# Patient Record
Sex: FEMALE | Race: WHITE | Employment: FULL TIME | URBAN - METROPOLITAN AREA
[De-identification: names, ages, dates, MRNs, and addresses within clinical notes are randomized per-mention and may not be internally consistent; named-entity substitution may affect disease eponyms.]

---

## 2017-05-15 ENCOUNTER — HOSPITAL ENCOUNTER (OUTPATIENT)
Age: 42
Discharge: HOME | End: 2017-05-15
Payer: COMMERCIAL

## 2017-05-15 VITALS
HEART RATE: 89 BPM | TEMPERATURE: 98 F | RESPIRATION RATE: 18 BRPM | OXYGEN SATURATION: 97 % | SYSTOLIC BLOOD PRESSURE: 130 MMHG | DIASTOLIC BLOOD PRESSURE: 95 MMHG

## 2017-05-15 DIAGNOSIS — J98.01 ACUTE BRONCHOSPASM: Primary | ICD-10-CM

## 2017-05-15 PROCEDURE — 99204 OFFICE O/P NEW MOD 45 MIN: CPT

## 2017-05-15 PROCEDURE — 99213 OFFICE O/P EST LOW 20 MIN: CPT

## 2017-05-15 RX ORDER — MONTELUKAST SODIUM 10 MG/1
10 TABLET ORAL NIGHTLY
Qty: 21 TABLET | Refills: 0 | Status: SHIPPED | OUTPATIENT
Start: 2017-05-15

## 2017-05-15 RX ORDER — PREDNISONE 20 MG/1
40 TABLET ORAL DAILY
Qty: 10 TABLET | Refills: 0 | Status: ON HOLD | OUTPATIENT
Start: 2017-05-15 | End: 2017-05-19

## 2017-05-15 RX ORDER — CODEINE PHOSPHATE AND GUAIFENESIN 10; 100 MG/5ML; MG/5ML
10 SOLUTION ORAL NIGHTLY PRN
Qty: 118 ML | Refills: 0 | Status: ON HOLD | OUTPATIENT
Start: 2017-05-15 | End: 2017-05-23

## 2017-05-15 NOTE — ED INITIAL ASSESSMENT (HPI)
Here for eval of cough and chest congestion that started on Friday. Sts that on Thursday she was using her inhaler more d/t feeling SOB. Sts that she is now using her inhaler every 4 hours w/ minimal relief.

## 2017-05-15 NOTE — ED PROVIDER NOTES
Patient Seen in: THE [facility] In [facility]    History   Patient presents with:  Cough    Stated Complaint: 5 DAYS COUGH,CHEST TIGHTNESS    HPI    Patient is a pleasant 49-year-old female.   Patient has a medical history of asthma.  5 days prior Age of Onset   • Breast Cancer Mother 40         Smoking Status: Never Smoker                      Smokeless Status: Never Used                        Alcohol Use: No                Review of Systems    Positive for stated complaint: 5 DAYS COUGH,CHEST TIG course initiated. Cheratussin for nighttime usage. Push clear fluids. Monitor for fever or chills. Please return if worse.       Disposition and Plan     Clinical Impression:  Acute bronchospasm  (primary encounter diagnosis)    Disposition:  Discharge

## 2017-05-18 ENCOUNTER — HOSPITAL ENCOUNTER (OUTPATIENT)
Facility: HOSPITAL | Age: 42
Setting detail: OBSERVATION
Discharge: HOME | End: 2017-05-19
Attending: EMERGENCY MEDICINE | Admitting: EMERGENCY MEDICINE
Payer: COMMERCIAL

## 2017-05-18 ENCOUNTER — APPOINTMENT (OUTPATIENT)
Dept: GENERAL RADIOLOGY | Facility: HOSPITAL | Age: 42
End: 2017-05-18
Attending: EMERGENCY MEDICINE
Payer: COMMERCIAL

## 2017-05-18 DIAGNOSIS — J45.901 ASTHMA EXACERBATION: Primary | ICD-10-CM

## 2017-05-18 PROCEDURE — 87486 CHLMYD PNEUM DNA AMP PROBE: CPT | Performed by: INTERNAL MEDICINE

## 2017-05-18 PROCEDURE — 99285 EMERGENCY DEPT VISIT HI MDM: CPT

## 2017-05-18 PROCEDURE — 85378 FIBRIN DEGRADE SEMIQUANT: CPT | Performed by: EMERGENCY MEDICINE

## 2017-05-18 PROCEDURE — 71020 XR CHEST PA + LAT CHEST (CPT=71020): CPT | Performed by: EMERGENCY MEDICINE

## 2017-05-18 PROCEDURE — 94640 AIRWAY INHALATION TREATMENT: CPT

## 2017-05-18 PROCEDURE — 84484 ASSAY OF TROPONIN QUANT: CPT | Performed by: EMERGENCY MEDICINE

## 2017-05-18 PROCEDURE — 87633 RESP VIRUS 12-25 TARGETS: CPT | Performed by: INTERNAL MEDICINE

## 2017-05-18 PROCEDURE — 99285 EMERGENCY DEPT VISIT HI MDM: CPT | Performed by: EMERGENCY MEDICINE

## 2017-05-18 PROCEDURE — 80053 COMPREHEN METABOLIC PANEL: CPT | Performed by: EMERGENCY MEDICINE

## 2017-05-18 PROCEDURE — 96374 THER/PROPH/DIAG INJ IV PUSH: CPT | Performed by: EMERGENCY MEDICINE

## 2017-05-18 PROCEDURE — 85025 COMPLETE CBC W/AUTO DIFF WBC: CPT | Performed by: EMERGENCY MEDICINE

## 2017-05-18 PROCEDURE — 93005 ELECTROCARDIOGRAM TRACING: CPT

## 2017-05-18 PROCEDURE — 93010 ELECTROCARDIOGRAM REPORT: CPT

## 2017-05-18 PROCEDURE — 94644 CONT INHLJ TX 1ST HOUR: CPT

## 2017-05-18 PROCEDURE — 87581 M.PNEUMON DNA AMP PROBE: CPT | Performed by: INTERNAL MEDICINE

## 2017-05-18 PROCEDURE — 87798 DETECT AGENT NOS DNA AMP: CPT | Performed by: INTERNAL MEDICINE

## 2017-05-18 PROCEDURE — 94645 CONT INHLJ TX EACH ADDL HOUR: CPT

## 2017-05-18 RX ORDER — METHYLPREDNISOLONE SODIUM SUCCINATE 125 MG/2ML
125 INJECTION, POWDER, LYOPHILIZED, FOR SOLUTION INTRAMUSCULAR; INTRAVENOUS ONCE
Status: COMPLETED | OUTPATIENT
Start: 2017-05-18 | End: 2017-05-18

## 2017-05-18 RX ORDER — CODEINE PHOSPHATE AND GUAIFENESIN 10; 100 MG/5ML; MG/5ML
10 SOLUTION ORAL NIGHTLY PRN
Status: DISCONTINUED | OUTPATIENT
Start: 2017-05-18 | End: 2017-05-19

## 2017-05-18 RX ORDER — FLUTICASONE PROPIONATE 50 MCG
1 SPRAY, SUSPENSION (ML) NASAL DAILY
COMMUNITY

## 2017-05-18 RX ORDER — PANTOPRAZOLE SODIUM 20 MG/1
20 TABLET, DELAYED RELEASE ORAL
Status: DISCONTINUED | OUTPATIENT
Start: 2017-05-19 | End: 2017-05-19

## 2017-05-18 RX ORDER — ALBUTEROL SULFATE 90 UG/1
2 AEROSOL, METERED RESPIRATORY (INHALATION) EVERY 6 HOURS PRN
COMMUNITY
End: 2022-01-06

## 2017-05-18 RX ORDER — HEPARIN SODIUM 5000 [USP'U]/ML
5000 INJECTION, SOLUTION INTRAVENOUS; SUBCUTANEOUS EVERY 8 HOURS SCHEDULED
Status: DISCONTINUED | OUTPATIENT
Start: 2017-05-18 | End: 2017-05-19

## 2017-05-18 RX ORDER — IPRATROPIUM BROMIDE AND ALBUTEROL SULFATE 2.5; .5 MG/3ML; MG/3ML
3 SOLUTION RESPIRATORY (INHALATION) ONCE
Status: COMPLETED | OUTPATIENT
Start: 2017-05-18 | End: 2017-05-18

## 2017-05-18 RX ORDER — CETIRIZINE HYDROCHLORIDE 10 MG/1
10 TABLET ORAL NIGHTLY
Status: DISCONTINUED | OUTPATIENT
Start: 2017-05-18 | End: 2017-05-19

## 2017-05-18 RX ORDER — DIPHENHYDRAMINE HCL 25 MG
25 CAPSULE ORAL EVERY 6 HOURS PRN
Status: DISCONTINUED | OUTPATIENT
Start: 2017-05-18 | End: 2017-05-19

## 2017-05-18 RX ORDER — FLUTICASONE PROPIONATE 50 MCG
1 SPRAY, SUSPENSION (ML) NASAL DAILY
Status: DISCONTINUED | OUTPATIENT
Start: 2017-05-18 | End: 2017-05-19

## 2017-05-18 RX ORDER — IPRATROPIUM BROMIDE AND ALBUTEROL SULFATE 2.5; .5 MG/3ML; MG/3ML
3 SOLUTION RESPIRATORY (INHALATION)
Status: DISCONTINUED | OUTPATIENT
Start: 2017-05-18 | End: 2017-05-19

## 2017-05-18 RX ORDER — METHYLPREDNISOLONE SODIUM SUCCINATE 40 MG/ML
60 INJECTION, POWDER, LYOPHILIZED, FOR SOLUTION INTRAMUSCULAR; INTRAVENOUS EVERY 8 HOURS
Status: DISCONTINUED | OUTPATIENT
Start: 2017-05-18 | End: 2017-05-19

## 2017-05-18 RX ORDER — POTASSIUM CHLORIDE 20 MEQ/1
40 TABLET, EXTENDED RELEASE ORAL EVERY 4 HOURS
Status: COMPLETED | OUTPATIENT
Start: 2017-05-18 | End: 2017-05-19

## 2017-05-18 RX ORDER — MONTELUKAST SODIUM 10 MG/1
10 TABLET ORAL NIGHTLY
Status: DISCONTINUED | OUTPATIENT
Start: 2017-05-18 | End: 2017-05-19

## 2017-05-18 RX ORDER — ONDANSETRON 2 MG/ML
4 INJECTION INTRAMUSCULAR; INTRAVENOUS EVERY 6 HOURS PRN
Status: DISCONTINUED | OUTPATIENT
Start: 2017-05-18 | End: 2017-05-19

## 2017-05-18 NOTE — ED PROVIDER NOTES
Patient Seen in: [facility] Emergency Department    History   Patient presents with:  Dyspnea BERNARDO SOB (respiratory)  Chest Pain Angina (cardiovascular)    Stated Complaint: asthma, chest tightness    HPI    51-year-old white female who presents to t Take 1 tablet by mouth nightly.    Albuterol Sulfate HFA (PROAIR HFA) 108 (90 BASE) MCG/ACT Inhalation Aero Soln,  2 puffs  q 4hrs prn with spacer       Family History   Problem Relation Age of Onset   • Breast Cancer Mother 40         Smoking Status: Never (14) - Abnormal; Notable for the following:     Glucose 101 (*)     AST 14 (*)     Potassium 3.4 (*)     All other components within normal limits   CBC W/ DIFFERENTIAL - Abnormal; Notable for the following:     Neutrophil Absolute Prelim 9.03 (*)     Neut x-ray reveals:  FINDINGS:    LUNGS:  No focal consolidation.  Normal vascularity. CARDIAC:  Normal size cardiac silhouette. MEDIASTINUM:  Normal.  PLEURA:  Normal.  No pleural effusions.   BONES:  Normal for age.      Impression:     CONCLUSION:  No conso

## 2017-05-18 NOTE — CONSULTS
Pulmonary / Critical Care H&P/Consult       NAME: Tom Goff - ROOM: [facility] - MRN: [id number] - Age: 43year old - :  1975    Date of Admission: 2017  7:34 AM  Admission Diagnosis: There are no admission diagnoses documented for this encoun Disp:  Rfl:    Montelukast Sodium (SINGULAIR) 10 MG Oral Tab Take 1 tablet (10 mg total) by mouth nightly. Disp: 21 tablet Rfl: 0   predniSONE 20 MG Oral Tab Take 2 tablets (40 mg total) by mouth daily.  Disp: 10 tablet Rfl: 0   guaiFENesin-codeine (CHERATU gallop   Abdomen:     Soft, non-tender, bowel sounds active all four quadrants,     no masses, no organomegaly   Extremities:   Extremities normal, atraumatic, no cyanosis or edema   Pulses:   2+ and symmetric all extremities   Skin:   Skin color, texture,

## 2017-05-18 NOTE — PLAN OF CARE
PAIN - ADULT    • Verbalizes/displays adequate comfort level or patient's stated pain goal Progressing        Patient/Family Goals    • Patient/Family Long Term Goal Progressing    • Patient/Family Short Term Goal Progressing        RISK FOR INFECTION - AD

## 2017-05-18 NOTE — ED INITIAL ASSESSMENT (HPI)
Patient to ED c/o chest tightness, hx of asthma. Patient was seen at the Immediate Care on Monday, doesn't feel like she is improving.   Has been using her inhalers, last albuterol at 4:30am.  Reports she came in today d/t lung tightness and congested coug

## 2017-05-19 VITALS
BODY MASS INDEX: 34.1 KG/M2 | HEIGHT: 68 IN | DIASTOLIC BLOOD PRESSURE: 75 MMHG | WEIGHT: 225 LBS | HEART RATE: 113 BPM | RESPIRATION RATE: 16 BRPM | OXYGEN SATURATION: 93 % | TEMPERATURE: 98 F | SYSTOLIC BLOOD PRESSURE: 129 MMHG

## 2017-05-19 PROCEDURE — 94640 AIRWAY INHALATION TREATMENT: CPT

## 2017-05-19 PROCEDURE — 85025 COMPLETE CBC W/AUTO DIFF WBC: CPT | Performed by: HOSPITALIST

## 2017-05-19 PROCEDURE — 84132 ASSAY OF SERUM POTASSIUM: CPT | Performed by: HOSPITALIST

## 2017-05-19 PROCEDURE — 80048 BASIC METABOLIC PNL TOTAL CA: CPT | Performed by: HOSPITALIST

## 2017-05-19 RX ORDER — PANTOPRAZOLE SODIUM 40 MG/1
40 TABLET, DELAYED RELEASE ORAL
Status: DISCONTINUED | OUTPATIENT
Start: 2017-05-20 | End: 2017-05-19

## 2017-05-19 RX ORDER — SUCRALFATE ORAL 1 G/10ML
1 SUSPENSION ORAL
Status: DISCONTINUED | OUTPATIENT
Start: 2017-05-19 | End: 2017-05-19

## 2017-05-19 RX ORDER — CALCIUM CARBONATE 200(500)MG
500 TABLET,CHEWABLE ORAL
Qty: 30 TABLET | Refills: 0 | Status: SHIPPED | OUTPATIENT
Start: 2017-05-19 | End: 2017-07-12 | Stop reason: CLARIF

## 2017-05-19 RX ORDER — PREDNISONE 20 MG/1
40 TABLET ORAL
Status: DISCONTINUED | OUTPATIENT
Start: 2017-05-19 | End: 2017-05-19

## 2017-05-19 RX ORDER — PREDNISONE 10 MG/1
TABLET ORAL
Qty: 21 TABLET | Refills: 0 | Status: ON HOLD | OUTPATIENT
Start: 2017-05-19 | End: 2017-05-23

## 2017-05-19 RX ORDER — OMEPRAZOLE 20 MG/1
40 CAPSULE, DELAYED RELEASE ORAL EVERY MORNING
Qty: 60 CAPSULE | Refills: 0 | Status: SHIPPED | OUTPATIENT
Start: 2017-05-19 | End: 2017-05-20

## 2017-05-19 RX ORDER — CALCIUM CARBONATE 200(500)MG
500 TABLET,CHEWABLE ORAL
Status: DISCONTINUED | OUTPATIENT
Start: 2017-05-19 | End: 2017-05-19

## 2017-05-19 RX ORDER — SUCRALFATE ORAL 1 G/10ML
1 SUSPENSION ORAL
Qty: 420 ML | Refills: 0 | Status: SHIPPED | OUTPATIENT
Start: 2017-05-19 | End: 2017-06-06 | Stop reason: ALTCHOICE

## 2017-05-19 NOTE — PROGRESS NOTES
Pulmonary Progress Note      NAME: Ashley Salter - ROOM: 329/329-A - MRN: [id number] - Age: 43year old - : 1975    Assessment/Plan:  1. Dyspnea, cough: secondary to asthma exac, feels better   - on room air  2.  Asthma exac: likely triggered by en 12.4   HCT  38.9   MCV  84.7   MCH  27.0   MCHC  31.9   RDW  14.2   NEPRELIM  13.40*   WBC  14.8*   PLT  377.0     Recent Labs   Lab  05/18/17   0806  05/19/17   0627   GLU  101*  180*   BUN  14  11   CREATSERUM  0.88  0.78   CA  8.9  9.1   ALB  3.5   --

## 2017-05-19 NOTE — PLAN OF CARE
PAIN - ADULT    • Verbalizes/displays adequate comfort level or patient's stated pain goal Completed        Patient/Family Goals    • Patient/Family Long Term Goal Completed    • Patient/Family Short Term Goal Completed        RESPIRATORY - ADULT    • Achi

## 2017-05-19 NOTE — DISCHARGE SUMMARY
General Medicine Discharge Summary     Patient ID:  Emmy Juarez  43year old  4/12/1975    Admit date: 5/18/2017    Discharge date and time: 5/19/17     Attending Physician: Katty Briseno MD     Prim allergy.   -RVP negative  -IV steroids- transitioned to prednisone taper on discharge  -scheduled BD on admission  -Arnuity, singulair  -Appreciate pulm consult  -f/u with allergy as outpatient next week    GERD  -On day of discharge with increased heartbur 05/19/17  1315   BP: 129/75   Pulse: 113   Temp: 98 °F (36.7 °C)   Resp: 16       General: no acute distress, alert and oriented x 3  Heart: normal S1, S2  Lungs: minimal end expiratory wheezing  Abdomen: nontender, nondistended, intact BS  Extremitie

## 2017-05-19 NOTE — PLAN OF CARE
Written and verbal discharge instructions given to patient and  verbalize understanding. IV discontinued in LT AC, angio-cath tip intact, site free from redness, swelling, or drainage, patient denies pain at site. Dressing applied. Prescription given.   Pa

## 2017-05-20 ENCOUNTER — APPOINTMENT (OUTPATIENT)
Dept: CT IMAGING | Facility: HOSPITAL | Age: 42
DRG: 871 | End: 2017-05-20
Attending: EMERGENCY MEDICINE
Payer: COMMERCIAL

## 2017-05-20 ENCOUNTER — HOSPITAL ENCOUNTER (INPATIENT)
Facility: HOSPITAL | Age: 42
LOS: 3 days | Discharge: HOME | DRG: 871 | End: 2017-05-23
Attending: EMERGENCY MEDICINE | Admitting: HOSPITALIST
Payer: COMMERCIAL

## 2017-05-20 ENCOUNTER — APPOINTMENT (OUTPATIENT)
Dept: GENERAL RADIOLOGY | Facility: HOSPITAL | Age: 42
DRG: 871 | End: 2017-05-20
Attending: EMERGENCY MEDICINE
Payer: COMMERCIAL

## 2017-05-20 DIAGNOSIS — J45.901 ASTHMA EXACERBATION: Primary | ICD-10-CM

## 2017-05-20 DIAGNOSIS — J18.9 COMMUNITY ACQUIRED PNEUMONIA: ICD-10-CM

## 2017-05-20 DIAGNOSIS — R73.9 HYPERGLYCEMIA: ICD-10-CM

## 2017-05-20 PROCEDURE — 85378 FIBRIN DEGRADE SEMIQUANT: CPT | Performed by: EMERGENCY MEDICINE

## 2017-05-20 PROCEDURE — 85025 COMPLETE CBC W/AUTO DIFF WBC: CPT | Performed by: EMERGENCY MEDICINE

## 2017-05-20 PROCEDURE — 94644 CONT INHLJ TX 1ST HOUR: CPT

## 2017-05-20 PROCEDURE — 84484 ASSAY OF TROPONIN QUANT: CPT | Performed by: EMERGENCY MEDICINE

## 2017-05-20 PROCEDURE — 93010 ELECTROCARDIOGRAM REPORT: CPT

## 2017-05-20 PROCEDURE — 94640 AIRWAY INHALATION TREATMENT: CPT

## 2017-05-20 PROCEDURE — 36415 COLL VENOUS BLD VENIPUNCTURE: CPT

## 2017-05-20 PROCEDURE — 93005 ELECTROCARDIOGRAM TRACING: CPT

## 2017-05-20 PROCEDURE — 83036 HEMOGLOBIN GLYCOSYLATED A1C: CPT | Performed by: HOSPITALIST

## 2017-05-20 PROCEDURE — 80048 BASIC METABOLIC PNL TOTAL CA: CPT | Performed by: EMERGENCY MEDICINE

## 2017-05-20 PROCEDURE — 99285 EMERGENCY DEPT VISIT HI MDM: CPT

## 2017-05-20 PROCEDURE — 71010 XR CHEST AP PORTABLE  (CPT=71010): CPT | Performed by: EMERGENCY MEDICINE

## 2017-05-20 PROCEDURE — 96361 HYDRATE IV INFUSION ADD-ON: CPT

## 2017-05-20 PROCEDURE — 96367 TX/PROPH/DG ADDL SEQ IV INF: CPT

## 2017-05-20 PROCEDURE — 71275 CT ANGIOGRAPHY CHEST: CPT | Performed by: EMERGENCY MEDICINE

## 2017-05-20 PROCEDURE — 87040 BLOOD CULTURE FOR BACTERIA: CPT | Performed by: EMERGENCY MEDICINE

## 2017-05-20 PROCEDURE — 96365 THER/PROPH/DIAG IV INF INIT: CPT

## 2017-05-20 RX ORDER — DIPHENHYDRAMINE HCL 25 MG
25 CAPSULE ORAL NIGHTLY PRN
Status: DISCONTINUED | OUTPATIENT
Start: 2017-05-20 | End: 2017-05-23

## 2017-05-20 RX ORDER — IPRATROPIUM BROMIDE AND ALBUTEROL SULFATE 2.5; .5 MG/3ML; MG/3ML
3 SOLUTION RESPIRATORY (INHALATION) EVERY 4 HOURS
Status: DISCONTINUED | OUTPATIENT
Start: 2017-05-20 | End: 2017-05-23

## 2017-05-20 RX ORDER — PREDNISONE 20 MG/1
40 TABLET ORAL DAILY
Status: COMPLETED | OUTPATIENT
Start: 2017-05-21 | End: 2017-05-23

## 2017-05-20 RX ORDER — SUCRALFATE ORAL 1 G/10ML
1 SUSPENSION ORAL
Status: DISCONTINUED | OUTPATIENT
Start: 2017-05-20 | End: 2017-05-23

## 2017-05-20 RX ORDER — FLUTICASONE PROPIONATE 50 MCG
1 SPRAY, SUSPENSION (ML) NASAL DAILY
Status: DISCONTINUED | OUTPATIENT
Start: 2017-05-21 | End: 2017-05-23

## 2017-05-20 RX ORDER — CETIRIZINE HYDROCHLORIDE 10 MG/1
10 TABLET ORAL EVERY EVENING
Status: DISCONTINUED | OUTPATIENT
Start: 2017-05-20 | End: 2017-05-23

## 2017-05-20 RX ORDER — CODEINE PHOSPHATE AND GUAIFENESIN 10; 100 MG/5ML; MG/5ML
10 SOLUTION ORAL NIGHTLY PRN
Status: DISCONTINUED | OUTPATIENT
Start: 2017-05-20 | End: 2017-05-21

## 2017-05-20 RX ORDER — PANTOPRAZOLE SODIUM 40 MG/1
40 TABLET, DELAYED RELEASE ORAL
Status: DISCONTINUED | OUTPATIENT
Start: 2017-05-21 | End: 2017-05-23

## 2017-05-20 RX ORDER — MONTELUKAST SODIUM 10 MG/1
10 TABLET ORAL NIGHTLY
Status: DISCONTINUED | OUTPATIENT
Start: 2017-05-20 | End: 2017-05-23

## 2017-05-20 RX ORDER — SODIUM CHLORIDE 9 MG/ML
INJECTION, SOLUTION INTRAVENOUS ONCE
Status: COMPLETED | OUTPATIENT
Start: 2017-05-20 | End: 2017-05-20

## 2017-05-20 RX ORDER — PREDNISONE 10 MG/1
10 TABLET ORAL
Status: DISCONTINUED | OUTPATIENT
Start: 2017-05-27 | End: 2017-05-23

## 2017-05-20 RX ORDER — DIPHENHYDRAMINE HCL 25 MG
25 CAPSULE ORAL NIGHTLY PRN
COMMUNITY
End: 2017-07-12 | Stop reason: ALTCHOICE

## 2017-05-20 RX ORDER — IPRATROPIUM BROMIDE AND ALBUTEROL SULFATE 2.5; .5 MG/3ML; MG/3ML
3 SOLUTION RESPIRATORY (INHALATION) EVERY 4 HOURS PRN
Status: DISCONTINUED | OUTPATIENT
Start: 2017-05-20 | End: 2017-05-20

## 2017-05-20 RX ORDER — HEPARIN SODIUM 5000 [USP'U]/ML
5000 INJECTION, SOLUTION INTRAVENOUS; SUBCUTANEOUS EVERY 8 HOURS SCHEDULED
Status: DISCONTINUED | OUTPATIENT
Start: 2017-05-20 | End: 2017-05-23

## 2017-05-20 RX ORDER — ALBUTEROL SULFATE 90 UG/1
2 AEROSOL, METERED RESPIRATORY (INHALATION) EVERY 6 HOURS PRN
Status: DISCONTINUED | OUTPATIENT
Start: 2017-05-20 | End: 2017-05-23

## 2017-05-20 RX ORDER — OMEPRAZOLE 20 MG/1
20 CAPSULE, DELAYED RELEASE ORAL
COMMUNITY

## 2017-05-20 RX ORDER — PREDNISONE 20 MG/1
20 TABLET ORAL
Status: DISCONTINUED | OUTPATIENT
Start: 2017-05-24 | End: 2017-05-23

## 2017-05-20 NOTE — H&P
[facility] hospitalist H+P    Luke Loo MD  CC SOB    HPI 44 yo female with hx of asthma and GERD, recent hospitalization with acute asthma exacerbation, discharge with Prednisone taper, presents with SOB, increased cough.  Patient has been taking Sodium (SINGULAIR) 10 MG Oral Tab Take 1 tablet (10 mg total) by mouth nightly. Disp: 21 tablet Rfl: 0   guaiFENesin-codeine (CHERATUSSIN AC) 100-10 MG/5ML Oral Solution Take 10 mL by mouth nightly as needed for cough.  Disp: 118 mL Rfl: 0   Fexofenadine HC

## 2017-05-20 NOTE — CONSULTS
[facility] - ANTIBIOTIC DOSING   FOCUS: Ceftriaxone and Azithromycin    Dot Bandar is a 43year old female for whom pharmacy is dosing Ceftriaxone and Azithromycin for treatment of  pneumonia per Dr. Yasmin [facility]. Allergies: has No Known Allergies.

## 2017-05-20 NOTE — ED INITIAL ASSESSMENT (HPI)
44 y/o female to ED with c/o of dyspea. Patient was discharged yesterday after being admitted Thursday due to same complaint. Patient has a hx of asthma, denies improvement. Wheezing auscultated to posterior lobes.

## 2017-05-20 NOTE — ED PROVIDER NOTES
Patient Seen in: [facility] Emergency Department    History   Patient presents with:  Dyspnea BERNARDO SOB (respiratory)    Stated Complaint: BERNARDO    HPI    Patient was admitted 2 days ago with asthma exacerbation.   She has a history of asthma and reflux cough. Fexofenadine HCl (ALLEGRA) 60 MG Oral Tab,  Take 1 tablet by mouth nightly.        Family History   Problem Relation Age of Onset   • Breast Cancer Mother 40         Smoking Status: Never Smoker                      Smokeless Status: Never Used Notable for the following:     Glucose 200 (*)     All other components within normal limits   D-DIMER - Abnormal; Notable for the following:     D-Dimer 0.50 (*)     All other components within normal limits    Narrative:      FEU = Fibrinogen Equivalent incomplete right bundle branch block            MDM   Patient has persistent bronchospasm despite being on IV steroids and a dose of 60 mg prednisone this morning. She does not have a nebulizer machine at home.   She was treated with continuous albuterol a

## 2017-05-21 PROCEDURE — 94664 DEMO&/EVAL PT USE INHALER: CPT

## 2017-05-21 PROCEDURE — 80048 BASIC METABOLIC PNL TOTAL CA: CPT | Performed by: HOSPITALIST

## 2017-05-21 PROCEDURE — 85025 COMPLETE CBC W/AUTO DIFF WBC: CPT | Performed by: HOSPITALIST

## 2017-05-21 PROCEDURE — 84145 PROCALCITONIN (PCT): CPT | Performed by: INTERNAL MEDICINE

## 2017-05-21 PROCEDURE — 82962 GLUCOSE BLOOD TEST: CPT

## 2017-05-21 PROCEDURE — 94640 AIRWAY INHALATION TREATMENT: CPT

## 2017-05-21 RX ORDER — DEXTROSE MONOHYDRATE 25 G/50ML
50 INJECTION, SOLUTION INTRAVENOUS
Status: DISCONTINUED | OUTPATIENT
Start: 2017-05-21 | End: 2017-05-23

## 2017-05-21 RX ORDER — CODEINE PHOSPHATE AND GUAIFENESIN 10; 100 MG/5ML; MG/5ML
10 SOLUTION ORAL EVERY 8 HOURS PRN
Status: DISCONTINUED | OUTPATIENT
Start: 2017-05-21 | End: 2017-05-23

## 2017-05-21 RX ORDER — PREDNISONE 20 MG/1
20 TABLET ORAL ONCE
Status: COMPLETED | OUTPATIENT
Start: 2017-05-21 | End: 2017-05-21

## 2017-05-21 NOTE — CONSULTS
Pulmonary H&P/Consult       NAME: Ashley Salter - ROOM: [address] [place] [address]- - MRN: [id number] - Age: 43year old - :  1975    Date of Admission: 2017  1:17 PM  Admission Diagnosis: Community acquired pneumonia [J18.9]  Hyperglycemia [R73.9]  Asthma exa Inhale 2 puffs into the lungs every 6 (six) hours as needed for Wheezing. Disp:  Rfl:  5/20/2017 at 0930   Beclomethasone Dipropionate 80 MCG/ACT Inhalation Aero Soln Inhale 1 puff into the lungs 2 (two) times daily.    Disp:  Rfl:  5/20/2017 at 0900   Mo Propionate 50 MCG/ACT Nasal Suspension 1 spray by Each Nare route daily. Disp:  Rfl:    Albuterol Sulfate  (90 Base) MCG/ACT Inhalation Aero Soln Inhale 2 puffs into the lungs every 6 (six) hours as needed for Wheezing.    Disp:  Rfl:    Beclometha strokes or seizure history   PSYCHE:  denies depression or anxiety   HEMATOLOGIC:  denies hx of anemia   ENDOCRINE:  denies thyroid history      OBJECTIVE:   05/20/17  1916 05/20/17  2100 05/21/17  0531 05/21/17  1147   BP:  141/85  137/86   Pulse: 103 107 normal, no rashes or lesions   Neurologic:   CNII-XII intact.  Normal strength, sensation and reflexes       throughout           Recent Labs   05/19/17  0627 05/20/17  1347 05/21/17  0631   WBC 14.8* 20.0* 12.4   HGB 12.4 12.9 11.9*   MCV 84.7 83.1 87.0

## 2017-05-21 NOTE — PROGRESS NOTES
[facility] hospitalist daily note  Seen/examined on 5/21/17    S; still coughing and SOB, no chest pain, no nausea    Medications in EPIC    PE:    05/21/17  1147   BP: 137/86   Pulse: 113   Temp: 98.8 °F (37.1 °C)   Resp: 16     Gen: awake, alert, coughing

## 2017-05-22 PROCEDURE — 94640 AIRWAY INHALATION TREATMENT: CPT

## 2017-05-22 PROCEDURE — 82962 GLUCOSE BLOOD TEST: CPT

## 2017-05-22 RX ORDER — BENZONATATE 200 MG/1
200 CAPSULE ORAL 3 TIMES DAILY
Status: DISCONTINUED | OUTPATIENT
Start: 2017-05-22 | End: 2017-05-23

## 2017-05-22 NOTE — PAYOR COMM NOTE
Attending Physician: No att. providers found    Review Type: ADMISSION   Reviewer: Angie Villanueva       Date: May 22, 2017 - 9:49 AM  Payor: Samara Cobian 150 O  Authorization Number: [id number]  Admit date: 5/18/2017  7:34 AM   Admitted from Emergency Dept.: yes    HPI 44 yo Given 5000 Units Subcutaneous (Left Lower Abdomen) Jeanine Chester RN    5/21/2017 2104 Given 5000 Units Subcutaneous (Right Lower Abdomen) Jeanine Chester RN    5/21/2017 1400 Given 5000 Units Subcutaneous (Left Lower Abdomen) Rory Stout RN      i other components within normal limits   TROPONIN I - Normal   D-DIMER - Normal    Narrative:      FEU = Fibrinogen Equivalent Units.     In non-pregnant females:  D-Dimer results of less than 0.5 ug/mL (FEU) have been shown to contribute to  the exclusion o <0.046    Assessment/plan  Sepsis, pulmonary source likely due to Pneumonia(patient with leukocytosis, tachycardic, tachypnic)  -, mass like consolidation on CT scan, started on Ceftriaxone/azithromycin  Consult ID    Asthma with recent acute excerbation;

## 2017-05-22 NOTE — CM/SW NOTE
Order received for home nebulizer. ECIN referral sent to [facility] who accepted the case. [facility] will deliver the neb to pt room either later today or tomorrow am.  Nurse was updated.    Estelle Orellana RN/TONO  [phone]

## 2017-05-22 NOTE — PROGRESS NOTES
[facility] Hospitalist Progress Note                                                                   [address] [address]  4/12/1975    SUBJECTIVE: Ms. Ritchie Conway feeling slightly better today.  Coughing mo Fluticasone Propionate  1 spray Each Nare Daily   • Montelukast Sodium  10 mg Oral Nightly   • predniSONE  40 mg Oral Daily   • sucralfate  1 g Oral TID AC and HS   • [START ON 5/24/2017] predniSONE  20 mg Oral Daily with breakfast   • [START ON 5/27/2017]

## 2017-05-22 NOTE — PROGRESS NOTES
Pulmonary Progress Note        NAME: Genesis Driscoll - ROOM: [facility]/654-A - MRN: [id number] - Age: 43year old - : 1975        SUBJECTIVE: No events overnight, cough slightly improved    OBJECTIVE:   17  1147 17  1916 17 05/21/17  0631    139   K 4.7 3.9    104   CO2 23.0 29.0   BUN 19 15   CA 8.8 8.7       No results for input(s): ALT, AST, ALB, AMYLASE, LIPASE, LDH in the last 72 hours.     Invalid input(s): ALPHOS, TBIL, DBIL, TPROT    Invalid input(s): ARTER

## 2017-05-22 NOTE — PAYOR COMM NOTE
Attending Physician: Akshat Etienne MD    Review Type: ADMISSION   Reviewer: Deyanira Tracey       Date: May 22, 2017 - 9:44 AM  Payor: JULIO CESAR DIAZ  Authorization Number: [id number]  Admit date: 5/20/2017  1:17 PM   Admitted from Emergency Dept.:   Yes 10 mL Oral Sreedhar Guevara RN      Heparin Sodium (Porcine) 5000 UNIT/ML injection 5,000 Units     Date Action Dose Route User    5/22/2017 0508 Given 5000 Units Subcutaneous (Left Lower Abdomen) Sreedhar Guevara RN    5/21/2017 2104 Given 5000 Units Subc BLOOD CULTURE   SPUTUM CULTURE       CTA chest:  No CT evidence for pulmonary embolism.      3.8 x 2.6 cm area of masslike consolidation retrocardiac left lung base suggestive of pneumonia. Recommend followup to assess for resolution.       Assessment/kevin

## 2017-05-22 NOTE — CM/SW NOTE
05/22/17 1200   CM/SW Screening   Referral Source Family   Information Source Chart review;Nursing rounds   Patient's Mental Status Alert;Oriented   Patient lives with Spouse   Patient Status Prior to Admission   Independent with ADLs and Mobility Yes

## 2017-05-23 VITALS
SYSTOLIC BLOOD PRESSURE: 139 MMHG | RESPIRATION RATE: 18 BRPM | WEIGHT: 220 LBS | HEIGHT: 68 IN | BODY MASS INDEX: 33.34 KG/M2 | HEART RATE: 86 BPM | OXYGEN SATURATION: 96 % | TEMPERATURE: 98 F | DIASTOLIC BLOOD PRESSURE: 79 MMHG

## 2017-05-23 PROCEDURE — 94640 AIRWAY INHALATION TREATMENT: CPT

## 2017-05-23 PROCEDURE — 82962 GLUCOSE BLOOD TEST: CPT

## 2017-05-23 RX ORDER — PREDNISONE 10 MG/1
TABLET ORAL
Qty: 30 TABLET | Refills: 0 | Status: SHIPPED | OUTPATIENT
Start: 2017-05-23 | End: 2017-06-06 | Stop reason: ALTCHOICE

## 2017-05-23 RX ORDER — CODEINE PHOSPHATE AND GUAIFENESIN 10; 100 MG/5ML; MG/5ML
10 SOLUTION ORAL 3 TIMES DAILY PRN
Qty: 118 ML | Refills: 0 | Status: SHIPPED | OUTPATIENT
Start: 2017-05-23 | End: 2017-06-06 | Stop reason: ALTCHOICE

## 2017-05-23 RX ORDER — CODEINE PHOSPHATE AND GUAIFENESIN 10; 100 MG/5ML; MG/5ML
10 SOLUTION ORAL 3 TIMES DAILY PRN
Qty: 118 ML | Refills: 0 | Status: SHIPPED | OUTPATIENT
Start: 2017-05-23 | End: 2017-05-23

## 2017-05-23 RX ORDER — CODEINE PHOSPHATE AND GUAIFENESIN 10; 100 MG/5ML; MG/5ML
10 SOLUTION ORAL EVERY 8 HOURS PRN
Qty: 20 ML | Refills: 0 | Status: SHIPPED | OUTPATIENT
Start: 2017-05-23 | End: 2017-05-23

## 2017-05-23 RX ORDER — BENZONATATE 200 MG/1
200 CAPSULE ORAL 3 TIMES DAILY PRN
Qty: 20 CAPSULE | Refills: 0 | Status: SHIPPED | OUTPATIENT
Start: 2017-05-23 | End: 2017-05-23

## 2017-05-23 RX ORDER — IPRATROPIUM BROMIDE AND ALBUTEROL SULFATE 2.5; .5 MG/3ML; MG/3ML
3 SOLUTION RESPIRATORY (INHALATION) EVERY 4 HOURS PRN
Qty: 60 VIAL | Refills: 1 | Status: SHIPPED | OUTPATIENT
Start: 2017-05-23 | End: 2017-07-12 | Stop reason: ALTCHOICE

## 2017-05-23 RX ORDER — AMOXICILLIN AND CLAVULANATE POTASSIUM 875; 125 MG/1; MG/1
1 TABLET, FILM COATED ORAL 2 TIMES DAILY
Qty: 10 TABLET | Refills: 0 | Status: SHIPPED | OUTPATIENT
Start: 2017-05-23 | End: 2017-05-28

## 2017-05-23 RX ORDER — BENZONATATE 200 MG/1
200 CAPSULE ORAL 3 TIMES DAILY PRN
Qty: 20 CAPSULE | Refills: 0 | Status: SHIPPED | OUTPATIENT
Start: 2017-05-23 | End: 2017-06-06 | Stop reason: ALTCHOICE

## 2017-05-23 NOTE — PROGRESS NOTES
Pt was dcd home with a script for robitussian with codeine and tessalon both were explained to pt , she verb understanding.  IV was dcd f/u appts were discussed she verb understanding

## 2017-05-23 NOTE — PROGRESS NOTES
[address] [place] [address] Patient Status:  Inpatient    1975 MRN [id number]   [facility] 5NW-A Attending Samantha Gallegos MD   Hosp Day # 3 PCP Leah Toledo MD     SUBJECTIVE:overall feels significantly better.  Still has tab 10 mg, 10 mg, Oral, QPM  •  Fluticasone Propionate (FLONASE) 50 MCG/ACT nasal spray 1 spray, 1 spray, Each Nare, Daily  •  Montelukast Sodium (SINGULAIR) tab 10 mg, 10 mg, Oral, Nightly  •  sucralfate (CARAFATE) 1 GM/10ML suspension 1 g, 1 g, Oral, TID

## 2017-05-23 NOTE — DISCHARGE SUMMARY
[facility]  Discharge Summary    Dinesh Larry Patient Status:  Inpatient    1975 MRN [id number]   [facility] 5NW-A Attending Paolo Manzanares MD   Hosp Day # 3 PCP Renetta Fiore MD     Date of Admission: 2017    Date o unspecified) patient on  Ceftriaxone/azithromycin during admission  Appreciate pulmonology Input, discharge with Augmentin  Per Pulmonology patient needs rpeat CT chest in 6-7 weeks (patient informed about this)    Asthma with recent acute excerbation; rec needed. Qty: 30 tablet Refills: 0    Fluticasone Propionate 50 MCG/ACT Nasal Suspension  1 spray by Each Nare route daily.       Albuterol Sulfate  (90 Base) MCG/ACT Inhalation Aero Soln  Inhale 2 puffs into the lungs every 6 (six) hours as needed f

## 2017-05-24 NOTE — CM/SW NOTE
Patient discharged on 05/23/2017 as previously planned.          05/24/17 0800   Discharge disposition   Discharged to: Home or Self   Name of [facility] services after discharge DME   HME provider (Nebulizer)   Discharge transp

## 2017-07-12 PROBLEM — J45.901 ASTHMA EXACERBATION: Status: RESOLVED | Noted: 2017-05-18 | Resolved: 2017-07-12

## 2017-07-12 PROBLEM — J45.901 ASTHMA EXACERBATION (HCC): Status: RESOLVED | Noted: 2017-05-18 | Resolved: 2017-07-12

## 2017-07-17 ENCOUNTER — HOSPITAL ENCOUNTER (OUTPATIENT)
Dept: CT IMAGING | Facility: HOSPITAL | Age: 42
Discharge: HOME | End: 2017-07-17
Attending: INTERNAL MEDICINE
Payer: COMMERCIAL

## 2017-07-17 DIAGNOSIS — R91.1 LUNG NODULE: ICD-10-CM

## 2017-07-17 PROCEDURE — 71250 CT THORAX DX C-: CPT | Performed by: INTERNAL MEDICINE

## 2018-02-27 PROBLEM — M76.72 PERONEAL TENDINITIS OF LEFT LOWER EXTREMITY: Status: ACTIVE | Noted: 2018-02-27

## 2018-02-27 PROBLEM — M77.8 EXTENSOR TENDINITIS OF FOOT: Status: ACTIVE | Noted: 2018-02-27

## 2019-08-29 PROBLEM — Z80.3 FAMILY HISTORY OF BREAST CANCER: Status: ACTIVE | Noted: 2019-08-29

## 2019-08-29 PROBLEM — Z13.71 BRCA GENE MUTATION NEGATIVE IN FEMALE: Status: ACTIVE | Noted: 2019-08-29

## 2019-08-29 PROCEDURE — 88175 CYTOPATH C/V AUTO FLUID REDO: CPT | Performed by: OBSTETRICS & GYNECOLOGY

## 2019-08-29 PROCEDURE — 87624 HPV HI-RISK TYP POOLED RSLT: CPT | Performed by: OBSTETRICS & GYNECOLOGY

## 2019-09-18 ENCOUNTER — LAB ENCOUNTER (OUTPATIENT)
Dept: LAB | Age: 44
End: 2019-09-18
Attending: INTERNAL MEDICINE
Payer: COMMERCIAL

## 2019-09-18 DIAGNOSIS — Z36.0 SCREENING FOR CHROMOSOMAL ANOMALIES BY AMNIOCENTESIS: Primary | ICD-10-CM

## 2020-07-07 PROBLEM — N60.12 FIBROCYSTIC BREAST CHANGES OF BOTH BREASTS: Status: ACTIVE | Noted: 2020-07-07

## 2020-07-07 PROBLEM — Z15.09 MONOALLELIC MUTATION OF ATM GENE: Status: ACTIVE | Noted: 2020-07-07

## 2020-07-07 PROBLEM — Z15.01 MONOALLELIC MUTATION OF ATM GENE: Status: ACTIVE | Noted: 2020-07-07

## 2020-07-07 PROBLEM — N60.11 FIBROCYSTIC BREAST CHANGES OF BOTH BREASTS: Status: ACTIVE | Noted: 2020-07-07

## 2020-07-07 PROBLEM — Z15.89 MONOALLELIC MUTATION OF ATM GENE: Status: ACTIVE | Noted: 2020-07-07

## 2020-11-24 PROBLEM — G57.62 NEUROMA OF SECOND INTERSPACE OF LEFT FOOT: Status: ACTIVE | Noted: 2020-11-24

## 2020-12-03 ENCOUNTER — HOSPITAL ENCOUNTER (OUTPATIENT)
Dept: MAMMOGRAPHY | Age: 45
Discharge: HOME | End: 2020-12-03
Attending: OBSTETRICS & GYNECOLOGY
Payer: COMMERCIAL

## 2020-12-03 DIAGNOSIS — Z80.3 FAMILY HISTORY OF BREAST CANCER: ICD-10-CM

## 2020-12-03 DIAGNOSIS — N60.12 FIBROCYSTIC BREAST CHANGES, BILATERAL: ICD-10-CM

## 2020-12-03 DIAGNOSIS — Z12.31 ENCOUNTER FOR SCREENING MAMMOGRAM FOR MALIGNANT NEOPLASM OF BREAST: ICD-10-CM

## 2020-12-03 DIAGNOSIS — N60.11 FIBROCYSTIC BREAST CHANGES, BILATERAL: ICD-10-CM

## 2020-12-03 DIAGNOSIS — N64.4 BREAST PAIN: ICD-10-CM

## 2020-12-03 PROCEDURE — 77067 SCR MAMMO BI INCL CAD: CPT | Performed by: NURSE PRACTITIONER

## 2020-12-03 PROCEDURE — 77063 BREAST TOMOSYNTHESIS BI: CPT | Performed by: NURSE PRACTITIONER

## 2020-12-08 ENCOUNTER — HOSPITAL ENCOUNTER (OUTPATIENT)
Dept: MAMMOGRAPHY | Facility: HOSPITAL | Age: 45
Discharge: HOME | End: 2020-12-08
Attending: NURSE PRACTITIONER
Payer: COMMERCIAL

## 2020-12-08 DIAGNOSIS — R92.2 INCONCLUSIVE MAMMOGRAM: ICD-10-CM

## 2020-12-08 PROCEDURE — 77065 DX MAMMO INCL CAD UNI: CPT | Performed by: NURSE PRACTITIONER

## 2020-12-08 PROCEDURE — 76642 ULTRASOUND BREAST LIMITED: CPT | Performed by: NURSE PRACTITIONER

## 2020-12-08 PROCEDURE — 77061 BREAST TOMOSYNTHESIS UNI: CPT | Performed by: NURSE PRACTITIONER

## 2021-02-10 PROBLEM — J18.9 COMMUNITY ACQUIRED PNEUMONIA: Status: RESOLVED | Noted: 2017-05-20 | Resolved: 2021-02-10

## 2021-02-10 PROBLEM — Z80.8 FAMILY HISTORY OF MELANOMA: Status: ACTIVE | Noted: 2021-02-10

## 2021-03-04 PROBLEM — R73.09 ELEVATED HEMOGLOBIN A1C: Status: ACTIVE | Noted: 2021-03-04

## 2021-03-04 PROBLEM — D50.9 IRON DEFICIENCY ANEMIA: Status: ACTIVE | Noted: 2021-03-04

## 2021-10-12 PROCEDURE — 88305 TISSUE EXAM BY PATHOLOGIST: CPT | Performed by: OBSTETRICS & GYNECOLOGY

## 2021-12-06 PROBLEM — M22.2X1 PATELLOFEMORAL PAIN SYNDROME OF RIGHT KNEE: Status: ACTIVE | Noted: 2021-12-06

## 2021-12-09 ENCOUNTER — HOSPITAL ENCOUNTER (OUTPATIENT)
Dept: MAMMOGRAPHY | Age: 46
Discharge: HOME | End: 2021-12-09
Attending: OBSTETRICS & GYNECOLOGY
Payer: COMMERCIAL

## 2021-12-09 DIAGNOSIS — Z12.31 ENCOUNTER FOR SCREENING MAMMOGRAM FOR BREAST CANCER: ICD-10-CM

## 2021-12-17 PROBLEM — Z12.11 COLON CANCER SCREENING: Status: ACTIVE | Noted: 2021-12-17

## 2022-03-22 PROBLEM — M25.511 CHRONIC PERISCAPULAR PAIN ON RIGHT SIDE: Status: ACTIVE | Noted: 2022-03-22

## 2022-03-22 PROBLEM — G89.29 CHRONIC PERISCAPULAR PAIN ON RIGHT SIDE: Status: ACTIVE | Noted: 2022-03-22

## 2022-10-18 ENCOUNTER — TELEPHONE (OUTPATIENT)
Dept: NEUROLOGY | Facility: CLINIC | Age: 47
End: 2022-10-18

## 2022-10-18 ENCOUNTER — OFFICE VISIT (OUTPATIENT)
Dept: PHYSICAL MEDICINE AND REHAB | Facility: CLINIC | Age: 47
End: 2022-10-18
Payer: COMMERCIAL

## 2022-10-18 VITALS — HEIGHT: 68 IN | HEART RATE: 90 BPM | BODY MASS INDEX: 33.34 KG/M2 | WEIGHT: 220 LBS | OXYGEN SATURATION: 99 %

## 2022-10-18 DIAGNOSIS — M17.11 PRIMARY OSTEOARTHRITIS OF RIGHT KNEE: Primary | ICD-10-CM

## 2022-10-18 DIAGNOSIS — K21.9 GASTROESOPHAGEAL REFLUX DISEASE, UNSPECIFIED WHETHER ESOPHAGITIS PRESENT: ICD-10-CM

## 2022-10-18 DIAGNOSIS — E66.9 OBESITY (BMI 30-39.9): ICD-10-CM

## 2022-10-18 PROCEDURE — 3008F BODY MASS INDEX DOCD: CPT | Performed by: PHYSICAL MEDICINE & REHABILITATION

## 2022-10-18 PROCEDURE — 99204 OFFICE O/P NEW MOD 45 MIN: CPT | Performed by: PHYSICAL MEDICINE & REHABILITATION

## 2022-10-18 NOTE — TELEPHONE ENCOUNTER
This [facility] [employer] member's plan does not currently require a prior authorization for these services                 Right knee injection and aspiration with ultrasound guidance          cpt codes 61448, . Decision ID #:[id number]. Will inform Nursing.

## 2022-10-23 ENCOUNTER — APPOINTMENT (OUTPATIENT)
Dept: GENERAL RADIOLOGY | Age: 47
End: 2022-10-23
Attending: EMERGENCY MEDICINE
Payer: COMMERCIAL

## 2022-10-23 ENCOUNTER — HOSPITAL ENCOUNTER (EMERGENCY)
Age: 47
Discharge: HOME | End: 2022-10-23
Attending: EMERGENCY MEDICINE
Payer: COMMERCIAL

## 2022-10-23 VITALS
HEIGHT: 68 IN | TEMPERATURE: 98 F | WEIGHT: 220 LBS | HEART RATE: 86 BPM | OXYGEN SATURATION: 95 % | SYSTOLIC BLOOD PRESSURE: 124 MMHG | RESPIRATION RATE: 20 BRPM | BODY MASS INDEX: 33.34 KG/M2 | DIASTOLIC BLOOD PRESSURE: 65 MMHG

## 2022-10-23 DIAGNOSIS — J20.9 ACUTE BRONCHITIS, UNSPECIFIED ORGANISM: Primary | ICD-10-CM

## 2022-10-23 LAB
POCT INFLUENZA A: NEGATIVE
POCT INFLUENZA B: NEGATIVE
SARS-COV-2 RNA RESP QL NAA+PROBE: NOT DETECTED

## 2022-10-23 PROCEDURE — 87502 INFLUENZA DNA AMP PROBE: CPT | Performed by: EMERGENCY MEDICINE

## 2022-10-23 PROCEDURE — 99284 EMERGENCY DEPT VISIT MOD MDM: CPT | Performed by: EMERGENCY MEDICINE

## 2022-10-23 PROCEDURE — 71046 X-RAY EXAM CHEST 2 VIEWS: CPT | Performed by: EMERGENCY MEDICINE

## 2022-10-23 RX ORDER — ALBUTEROL SULFATE 2.5 MG/3ML
2.5 SOLUTION RESPIRATORY (INHALATION) EVERY 4 HOURS PRN
Qty: 30 EACH | Refills: 0 | Status: SHIPPED | OUTPATIENT
Start: 2022-10-23 | End: 2022-11-22

## 2022-10-23 RX ORDER — PREDNISONE 20 MG/1
40 TABLET ORAL DAILY
Qty: 8 TABLET | Refills: 0 | Status: SHIPPED | OUTPATIENT
Start: 2022-10-23 | End: 2022-10-27

## 2022-10-23 RX ORDER — PREDNISONE 20 MG/1
60 TABLET ORAL ONCE
Status: COMPLETED | OUTPATIENT
Start: 2022-10-23 | End: 2022-10-23

## 2022-10-23 NOTE — ED INITIAL ASSESSMENT (HPI)
PT to the ED for evaluation of sore throat, cough and congestion. Sore throat began last Sunday but has resolved. Cough recently became productive. Denies fevers. COVID home test negative x's3.

## 2024-08-30 ENCOUNTER — APPOINTMENT (OUTPATIENT)
Dept: CT IMAGING | Age: 49
End: 2024-08-30
Attending: EMERGENCY MEDICINE
Payer: COMMERCIAL

## 2024-08-30 ENCOUNTER — HOSPITAL ENCOUNTER (EMERGENCY)
Age: 49
Discharge: HOME | End: 2024-08-31
Attending: EMERGENCY MEDICINE
Payer: COMMERCIAL

## 2024-08-30 DIAGNOSIS — N20.0 KIDNEY STONE: Primary | ICD-10-CM

## 2024-08-30 LAB
ALBUMIN SERPL-MCNC: 3.6 G/DL (ref 3.4–5)
ALBUMIN/GLOB SERPL: 1.2 {RATIO} (ref 1–2)
ALP LIVER SERPL-CCNC: 72 U/L
ALT SERPL-CCNC: 24 U/L
ANION GAP SERPL CALC-SCNC: 4 MMOL/L (ref 0–18)
AST SERPL-CCNC: 10 U/L (ref 15–37)
BASOPHILS # BLD AUTO: 0.02 X10(3) UL (ref 0–0.2)
BASOPHILS NFR BLD AUTO: 0.2 %
BILIRUB SERPL-MCNC: 0.2 MG/DL (ref 0.1–2)
BILIRUB UR QL STRIP.AUTO: NEGATIVE
BUN BLD-MCNC: 15 MG/DL (ref 9–23)
CALCIUM BLD-MCNC: 9.1 MG/DL (ref 8.5–10.1)
CHLORIDE SERPL-SCNC: 104 MMOL/L (ref 98–112)
CLARITY UR REFRACT.AUTO: CLEAR
CO2 SERPL-SCNC: 30 MMOL/L (ref 21–32)
COLOR UR AUTO: YELLOW
CREAT BLD-MCNC: 1.02 MG/DL
EGFRCR SERPLBLD CKD-EPI 2021: 67 ML/MIN/1.73M2 (ref 60–?)
EOSINOPHIL # BLD AUTO: 0.18 X10(3) UL (ref 0–0.7)
EOSINOPHIL NFR BLD AUTO: 1.9 %
ERYTHROCYTE [DISTWIDTH] IN BLOOD BY AUTOMATED COUNT: 13.8 %
GLOBULIN PLAS-MCNC: 3.1 G/DL (ref 2.8–4.4)
GLUCOSE BLD-MCNC: 156 MG/DL (ref 70–99)
GLUCOSE UR STRIP.AUTO-MCNC: NEGATIVE MG/DL
HCT VFR BLD AUTO: 36.4 %
HGB BLD-MCNC: 11.8 G/DL
IMM GRANULOCYTES # BLD AUTO: 0.03 X10(3) UL (ref 0–1)
IMM GRANULOCYTES NFR BLD: 0.3 %
KETONES UR STRIP.AUTO-MCNC: NEGATIVE MG/DL
LEUKOCYTE ESTERASE UR QL STRIP.AUTO: NEGATIVE
LIPASE SERPL-CCNC: 37 U/L (ref 12–53)
LYMPHOCYTES # BLD AUTO: 2.09 X10(3) UL (ref 1–4)
LYMPHOCYTES NFR BLD AUTO: 22.4 %
MCH RBC QN AUTO: 28 PG (ref 26–34)
MCHC RBC AUTO-ENTMCNC: 32.4 G/DL (ref 31–37)
MCV RBC AUTO: 86.3 FL
MONOCYTES # BLD AUTO: 0.69 X10(3) UL (ref 0.1–1)
MONOCYTES NFR BLD AUTO: 7.4 %
NEUTROPHILS # BLD AUTO: 6.3 X10 (3) UL (ref 1.5–7.7)
NEUTROPHILS # BLD AUTO: 6.3 X10(3) UL (ref 1.5–7.7)
NEUTROPHILS NFR BLD AUTO: 67.8 %
NITRITE UR QL STRIP.AUTO: NEGATIVE
OSMOLALITY SERPL CALC.SUM OF ELEC: 290 MOSM/KG (ref 275–295)
PH UR STRIP.AUTO: 5.5 [PH] (ref 5–8)
PLATELET # BLD AUTO: 317 10(3)UL (ref 150–450)
POTASSIUM SERPL-SCNC: 3.7 MMOL/L (ref 3.5–5.1)
PROT SERPL-MCNC: 6.7 G/DL (ref 6.4–8.2)
RBC # BLD AUTO: 4.22 X10(6)UL
RBC #/AREA URNS AUTO: >10 /HPF
SODIUM SERPL-SCNC: 138 MMOL/L (ref 136–145)
SP GR UR STRIP.AUTO: 1.02 (ref 1–1.03)
UROBILINOGEN UR STRIP.AUTO-MCNC: 0.2 MG/DL
WBC # BLD AUTO: 9.3 X10(3) UL (ref 4–11)

## 2024-08-30 PROCEDURE — 80053 COMPREHEN METABOLIC PANEL: CPT | Performed by: EMERGENCY MEDICINE

## 2024-08-30 PROCEDURE — 81015 MICROSCOPIC EXAM OF URINE: CPT | Performed by: EMERGENCY MEDICINE

## 2024-08-30 PROCEDURE — 99285 EMERGENCY DEPT VISIT HI MDM: CPT

## 2024-08-30 PROCEDURE — 85025 COMPLETE CBC W/AUTO DIFF WBC: CPT | Performed by: EMERGENCY MEDICINE

## 2024-08-30 PROCEDURE — 74176 CT ABD & PELVIS W/O CONTRAST: CPT | Performed by: EMERGENCY MEDICINE

## 2024-08-30 PROCEDURE — 36415 COLL VENOUS BLD VENIPUNCTURE: CPT

## 2024-08-30 PROCEDURE — 81001 URINALYSIS AUTO W/SCOPE: CPT | Performed by: EMERGENCY MEDICINE

## 2024-08-30 PROCEDURE — 99284 EMERGENCY DEPT VISIT MOD MDM: CPT

## 2024-08-30 PROCEDURE — 83690 ASSAY OF LIPASE: CPT | Performed by: EMERGENCY MEDICINE

## 2024-08-31 VITALS
HEIGHT: 68 IN | SYSTOLIC BLOOD PRESSURE: 130 MMHG | RESPIRATION RATE: 16 BRPM | OXYGEN SATURATION: 98 % | BODY MASS INDEX: 33.34 KG/M2 | WEIGHT: 220 LBS | HEART RATE: 65 BPM | TEMPERATURE: 99 F | DIASTOLIC BLOOD PRESSURE: 50 MMHG

## 2024-08-31 RX ORDER — ONDANSETRON 4 MG/1
4 TABLET, ORALLY DISINTEGRATING ORAL EVERY 8 HOURS PRN
Qty: 20 TABLET | Refills: 0 | Status: ON HOLD | OUTPATIENT
Start: 2024-08-31 | End: 2024-09-09

## 2024-08-31 RX ORDER — HYDROCODONE BITARTRATE AND ACETAMINOPHEN 5; 325 MG/1; MG/1
1-2 TABLET ORAL EVERY 6 HOURS PRN
Qty: 20 TABLET | Refills: 0 | Status: ON HOLD | OUTPATIENT
Start: 2024-08-31 | End: 2024-09-09

## 2024-08-31 NOTE — ED INITIAL ASSESSMENT (HPI)
Pt to ED with right flank pain that started tonight, +nausea. Afebrile, denies urinary sx. Hx kidney stones.

## 2024-08-31 NOTE — DISCHARGE INSTRUCTIONS
Return to emergency room if increased pain, fever, chills or other problems    Medication as prescribed    Push fluids    Strain urine    Motrin for mild pain    Norco for more severe pain

## 2024-08-31 NOTE — ED PROVIDER NOTES
Patient Seen in: ward Emergency Department In [place]      History     Chief Complaint   Patient presents with    Abdomen/Flank Pain     Stated Complaint: Right flank pain    Subjective:   HPI    Patient a 49-year-old female history kidney stones presents to ED for evaluation of right flank pain.  Patient started with sudden onset of right flank pain felt like a cramp for about an hour to 15 minutes.  Went away and then came back for about an hour.  She is currently pain-free.  She had some nausea but no vomiting.  Symptoms felt similar to prior kidney stone.  She denies fever, vomiting.  Some nausea.  No abdominal pain.  Denies urinary symptoms such as frequency, hematuria or dysuria.    Objective:   Past Medical History:    Asthma (HCC)    BRCA1 negative    BRCA2 negative    Esophageal reflux    Mild intermittent asthma (HCC)              Past Surgical History:   Procedure Laterality Date    Colonoscopy N/A 2021    Procedure: COLONOSCOPY;  Surgeon: Pierce Amin MD;  Location: [facility], repeat in 10 years           x 2                 Social History     Socioeconomic History    Marital status:    Tobacco Use    Smoking status: Never    Smokeless tobacco: Never   Vaping Use    Vaping status: Never Used   Substance and Sexual Activity    Alcohol use: No    Drug use: No    Sexual activity: Yes     Partners: Male     Birth control/protection: Vasectomy   Social History Narrative     with children               Review of Systems    Positive for stated Chief Complaint: Abdomen/Flank Pain    Other systems are as noted in HPI.  Constitutional and vital signs reviewed.      All other systems reviewed and negative except as noted above.    Physical Exam     ED Triage Vitals   BP 24 2232 133/71   Pulse 24 2232 73   Resp 24 2232 16   Temp 24 2232 98.5 °F (36.9 °C)   Temp src 24 2232 Oral   SpO2 24 2232 99 %   O2 Device 24 0014 None (Room  air)       Current Vitals:   Vital Signs  BP: 130/50  Pulse: 65  Resp: 16  Temp: 98.5 °F (36.9 °C)  Temp src: Oral    Oxygen Therapy  SpO2: 98 %  O2 Device: None (Room air)            Physical Exam    CONSTITUTIONAL: Well appearing, in no acute distress  LUNGS: Clear to auscultation bilaterally  CARDIOVASCULAR: Regular rate and rhythm, normal S1-S2  ABDOMINAL: Soft, nontender, nondistended  SKIN: Warm and dry  Back: No CVA tenderness    ED Course     Labs Reviewed   COMP METABOLIC PANEL (14) - Abnormal; Notable for the following components:       Result Value    Glucose 156 (*)     AST 10 (*)     All other components within normal limits   CBC WITH DIFFERENTIAL WITH PLATELET - Abnormal; Notable for the following components:    HGB 11.8 (*)     All other components within normal limits   URINALYSIS WITH CULTURE REFLEX - Abnormal; Notable for the following components:    Blood Urine Large (*)     Protein Urine Trace (*)     All other components within normal limits   UA MICROSCOPIC ONLY, URINE - Abnormal; Notable for the following components:    RBC Urine >10 (*)     Bacteria Urine Rare (*)     Squamous Epi. Cells Few (*)     All other components within normal limits   LIPASE - Normal   Hemoglobin 11.8          I personally reviewd CT images of abdomen and pelvis and independent interpretation shows proximal right ureteral stone.  I also viewed formal radiology report as read by radiology with findings below:  CT ABDOMEN+PELVIS KIDNEYSTONE 2D RNDR(NO IV,NO ORAL)(CPT=74176)    Result Date: 8/30/2024  PROCEDURE:  CT ABDOMEN+PELVIS KIDNEYSTONE 2D RNDR(NO IV,NO ORAL)(CPT=74176)  COMPARISON:  [facility] , CT, CT ABD(S)/PLV(S)KID STONE(SET), 2/18/2005, 8:14 PM.  INDICATIONS:  Right flank pain  TECHNIQUE:  Unenhanced multislice CT scanning from above the kidneys to below the urinary bladder.  2D rendering are generated on the CT scanner workstation to localize potential stones in the cranio-caudal plane.  Dose reduction  techniques were used. Dose information is transmitted to the ACR (American College of Radiology) NRDR (National Radiology Data Registry) which includes the Dose Index Registry.  PATIENT STATED HISTORY: (As transcribed by Technologist)  Right flank pain, history of kidney stones.    FINDINGS:  KIDNEYS:  7 x 5 mm proximal right ureteral calculus with mild right hydronephrosis. BLADDER:  No mass, calculus or significant wall thickening. ADRENALS:  No mass or enlargement.  LIVER:  No enlargement, atrophy, abnormal density, or significant focal lesion.  BILIARY:  No visible dilatation or calcification.  PANCREAS:  No lesion, fluid collection, ductal dilatation, or atrophy.  SPLEEN:  No enlargement or focal lesion.  AORTA/VASCULAR:  No aneurysm.  RETROPERITONEUM:  No mass or adenopathy.  BOWEL/MESENTERY:  Occasional uncomplicated colonic diverticula.  No bowel distention or bowel wall thickening. ABDOMINAL WALL:  Stable small fat containing umbilical hernia BONES:  No bony lesion or fracture. PELVIC ORGANS:  Normal for age.  Left ovarian cyst measuring up to 2.8 cm is consistent with benign functional cyst and requires no further workup or follow-up. LUNG BASES:  No visible pulmonary or pleural disease.              CONCLUSION:  1. 7 x 5 mm proximal right ureteral calculus with mild right hydronephrosis. 2. Rare uncomplicated colonic diverticula.    LOCATION:  [facility]   Dictated by (CST): Rene Monroe MD on 8/30/2024 at 11:27 PM     Finalized by (CST): Rene Monroe MD on 8/30/2024 at 11:33 PM               MDM      Patient is a 49-year-old female presents to ED for evaluation of right-sided flank pain.  Differential codes pyelonephritis, kidney stone.  Urinalysis shows blood but no evidence for infection.  Hemoglobin 11.8.  Normal kidney function.  CT scan shows large 7 x 5 mm right proximal ureteral stone.  Patient has not required pain medication here.  Patient was advised to return to ER if increased pain, fever,  chills. Push fluids and strain urine. Patient will be treated with narcotics, anti-emetic.  Follow-up with urology and patient told that she likely will need stent as an outpatient given size of stone as it may be difficult to pass.    Patient was screened and evaluated during this visit.   As a treating physician attending to the patient, I determined, within reasonable clinical confidence and prior to discharge, that an emergency medical condition was not or was no longer present.  There was no indication for further evaluation, treatment or admission on an emergency basis.  Comprehensive verbal and written discharge and follow-up instructions were provided to help prevent relapse or worsening.  Patient was instructed to follow-up with her primary care provider for further evaluation and treatment, but to return immediately to the ER for worsening, concerning, new, changing or persisting symptoms.  I discussed the case with the patient and they had no questions, complaints, or concerns.  Patient felt comfortable going home.                                       MDM    Disposition and Plan     Clinical Impression:  1. Kidney stone         Disposition:  Discharge  8/31/2024 12:20 am    Follow-up:  Rene Bird MD  [address]  [address] [address]  [phone]    Follow up  As needed          Medications Prescribed:  Discharge Medication List as of 8/31/2024 12:26 AM        START taking these medications    Details   HYDROcodone-acetaminophen 5-325 MG Oral Tab Take 1-2 tablets by mouth every 6 (six) hours as needed for Pain., Normal, Disp-20 tablet, R-0      ondansetron 4 MG Oral Tablet Dispersible Take 1 tablet (4 mg total) by mouth every 8 (eight) hours as needed for Nausea., Normal, Disp-20 tablet, R-0

## 2024-09-09 ENCOUNTER — HOSPITAL ENCOUNTER (OUTPATIENT)
Facility: HOSPITAL | Age: 49
Setting detail: OBSERVATION
Discharge: HOME | End: 2024-09-11
Attending: EMERGENCY MEDICINE | Admitting: INTERNAL MEDICINE
Payer: COMMERCIAL

## 2024-09-09 ENCOUNTER — APPOINTMENT (OUTPATIENT)
Dept: GENERAL RADIOLOGY | Facility: HOSPITAL | Age: 49
End: 2024-09-09
Attending: EMERGENCY MEDICINE
Payer: COMMERCIAL

## 2024-09-09 DIAGNOSIS — N20.0 KIDNEY STONE: Primary | ICD-10-CM

## 2024-09-09 LAB
ALBUMIN SERPL-MCNC: 4.8 G/DL (ref 3.2–4.8)
ALBUMIN/GLOB SERPL: 1.6 {RATIO} (ref 1–2)
ALP LIVER SERPL-CCNC: 81 U/L
ALT SERPL-CCNC: 15 U/L
ANION GAP SERPL CALC-SCNC: 7 MMOL/L (ref 0–18)
AST SERPL-CCNC: 25 U/L (ref ?–34)
BASOPHILS # BLD AUTO: 0.04 X10(3) UL (ref 0–0.2)
BASOPHILS NFR BLD AUTO: 0.3 %
BILIRUB SERPL-MCNC: 0.4 MG/DL (ref 0.3–1.2)
BILIRUB UR QL STRIP.AUTO: NEGATIVE
BUN BLD-MCNC: 15 MG/DL (ref 9–23)
CALCIUM BLD-MCNC: 9.7 MG/DL (ref 8.7–10.4)
CHLORIDE SERPL-SCNC: 103 MMOL/L (ref 98–112)
CLARITY UR REFRACT.AUTO: CLEAR
CO2 SERPL-SCNC: 27 MMOL/L (ref 21–32)
CREAT BLD-MCNC: 1.16 MG/DL
EGFRCR SERPLBLD CKD-EPI 2021: 58 ML/MIN/1.73M2 (ref 60–?)
EOSINOPHIL # BLD AUTO: 0.03 X10(3) UL (ref 0–0.7)
EOSINOPHIL NFR BLD AUTO: 0.3 %
ERYTHROCYTE [DISTWIDTH] IN BLOOD BY AUTOMATED COUNT: 13.6 %
GLOBULIN PLAS-MCNC: 3 G/DL (ref 2–3.5)
GLUCOSE BLD-MCNC: 111 MG/DL (ref 70–99)
GLUCOSE UR STRIP.AUTO-MCNC: NORMAL MG/DL
HCT VFR BLD AUTO: 40.4 %
HGB BLD-MCNC: 13.4 G/DL
IMM GRANULOCYTES # BLD AUTO: 0.03 X10(3) UL (ref 0–1)
IMM GRANULOCYTES NFR BLD: 0.3 %
KETONES UR STRIP.AUTO-MCNC: 10 MG/DL
LEUKOCYTE ESTERASE UR QL STRIP.AUTO: NEGATIVE
LIPASE SERPL-CCNC: 27 U/L (ref 12–53)
LYMPHOCYTES # BLD AUTO: 1.23 X10(3) UL (ref 1–4)
LYMPHOCYTES NFR BLD AUTO: 10.7 %
MCH RBC QN AUTO: 28.3 PG (ref 26–34)
MCHC RBC AUTO-ENTMCNC: 33.2 G/DL (ref 31–37)
MCV RBC AUTO: 85.2 FL
MONOCYTES # BLD AUTO: 0.64 X10(3) UL (ref 0.1–1)
MONOCYTES NFR BLD AUTO: 5.5 %
NEUTROPHILS # BLD AUTO: 9.57 X10 (3) UL (ref 1.5–7.7)
NEUTROPHILS # BLD AUTO: 9.57 X10(3) UL (ref 1.5–7.7)
NEUTROPHILS NFR BLD AUTO: 82.9 %
NITRITE UR QL STRIP.AUTO: NEGATIVE
OSMOLALITY SERPL CALC.SUM OF ELEC: 286 MOSM/KG (ref 275–295)
PH UR STRIP.AUTO: 5.5 [PH] (ref 5–8)
PLATELET # BLD AUTO: 368 10(3)UL (ref 150–450)
POTASSIUM SERPL-SCNC: 4.6 MMOL/L (ref 3.5–5.1)
PROT SERPL-MCNC: 7.8 G/DL (ref 5.7–8.2)
PROT UR STRIP.AUTO-MCNC: NEGATIVE MG/DL
RBC # BLD AUTO: 4.74 X10(6)UL
SODIUM SERPL-SCNC: 137 MMOL/L (ref 136–145)
SP GR UR STRIP.AUTO: 1.02 (ref 1–1.03)
UROBILINOGEN UR STRIP.AUTO-MCNC: NORMAL MG/DL
WBC # BLD AUTO: 11.5 X10(3) UL (ref 4–11)

## 2024-09-09 PROCEDURE — 99285 EMERGENCY DEPT VISIT HI MDM: CPT

## 2024-09-09 PROCEDURE — 96375 TX/PRO/DX INJ NEW DRUG ADDON: CPT

## 2024-09-09 PROCEDURE — 85025 COMPLETE CBC W/AUTO DIFF WBC: CPT

## 2024-09-09 PROCEDURE — 80053 COMPREHEN METABOLIC PANEL: CPT | Performed by: EMERGENCY MEDICINE

## 2024-09-09 PROCEDURE — 74018 RADEX ABDOMEN 1 VIEW: CPT | Performed by: EMERGENCY MEDICINE

## 2024-09-09 PROCEDURE — 85025 COMPLETE CBC W/AUTO DIFF WBC: CPT | Performed by: EMERGENCY MEDICINE

## 2024-09-09 PROCEDURE — 96374 THER/PROPH/DIAG INJ IV PUSH: CPT

## 2024-09-09 PROCEDURE — 83690 ASSAY OF LIPASE: CPT | Performed by: EMERGENCY MEDICINE

## 2024-09-09 PROCEDURE — 83690 ASSAY OF LIPASE: CPT

## 2024-09-09 PROCEDURE — 81001 URINALYSIS AUTO W/SCOPE: CPT | Performed by: EMERGENCY MEDICINE

## 2024-09-09 PROCEDURE — 80053 COMPREHEN METABOLIC PANEL: CPT

## 2024-09-09 RX ORDER — ONDANSETRON 2 MG/ML
4 INJECTION INTRAMUSCULAR; INTRAVENOUS ONCE
Status: COMPLETED | OUTPATIENT
Start: 2024-09-09 | End: 2024-09-09

## 2024-09-09 RX ORDER — BISACODYL 10 MG
10 SUPPOSITORY, RECTAL RECTAL
Status: DISCONTINUED | OUTPATIENT
Start: 2024-09-09 | End: 2024-09-11

## 2024-09-09 RX ORDER — HYDROCODONE BITARTRATE AND ACETAMINOPHEN 5; 325 MG/1; MG/1
1 TABLET ORAL EVERY 4 HOURS PRN
Status: DISCONTINUED | OUTPATIENT
Start: 2024-09-09 | End: 2024-09-11

## 2024-09-09 RX ORDER — ACETAMINOPHEN 325 MG/1
650 TABLET ORAL EVERY 4 HOURS PRN
Status: DISCONTINUED | OUTPATIENT
Start: 2024-09-09 | End: 2024-09-11

## 2024-09-09 RX ORDER — SENNOSIDES 8.6 MG
17.2 TABLET ORAL NIGHTLY PRN
Status: DISCONTINUED | OUTPATIENT
Start: 2024-09-09 | End: 2024-09-11

## 2024-09-09 RX ORDER — POLYETHYLENE GLYCOL 3350 17 G/17G
17 POWDER, FOR SOLUTION ORAL DAILY PRN
Status: DISCONTINUED | OUTPATIENT
Start: 2024-09-09 | End: 2024-09-11

## 2024-09-09 RX ORDER — SODIUM CHLORIDE, SODIUM LACTATE, POTASSIUM CHLORIDE, CALCIUM CHLORIDE 600; 310; 30; 20 MG/100ML; MG/100ML; MG/100ML; MG/100ML
INJECTION, SOLUTION INTRAVENOUS CONTINUOUS
Status: DISCONTINUED | OUTPATIENT
Start: 2024-09-09 | End: 2024-09-11

## 2024-09-09 RX ORDER — PROCHLORPERAZINE EDISYLATE 5 MG/ML
5 INJECTION INTRAMUSCULAR; INTRAVENOUS EVERY 8 HOURS PRN
Status: DISCONTINUED | OUTPATIENT
Start: 2024-09-09 | End: 2024-09-11

## 2024-09-09 RX ORDER — SODIUM PHOSPHATE, DIBASIC AND SODIUM PHOSPHATE, MONOBASIC 7; 19 G/230ML; G/230ML
1 ENEMA RECTAL ONCE AS NEEDED
Status: DISCONTINUED | OUTPATIENT
Start: 2024-09-09 | End: 2024-09-11

## 2024-09-09 RX ORDER — HYDROMORPHONE HYDROCHLORIDE 1 MG/ML
0.5 INJECTION, SOLUTION INTRAMUSCULAR; INTRAVENOUS; SUBCUTANEOUS EVERY 30 MIN PRN
Status: ACTIVE | OUTPATIENT
Start: 2024-09-09 | End: 2024-09-09

## 2024-09-09 RX ORDER — KETOROLAC TROMETHAMINE 15 MG/ML
15 INJECTION, SOLUTION INTRAMUSCULAR; INTRAVENOUS EVERY 6 HOURS PRN
Status: DISCONTINUED | OUTPATIENT
Start: 2024-09-09 | End: 2024-09-10

## 2024-09-09 RX ORDER — KETOROLAC TROMETHAMINE 30 MG/ML
30 INJECTION, SOLUTION INTRAMUSCULAR; INTRAVENOUS ONCE
Status: COMPLETED | OUTPATIENT
Start: 2024-09-09 | End: 2024-09-09

## 2024-09-09 RX ORDER — ONDANSETRON 2 MG/ML
4 INJECTION INTRAMUSCULAR; INTRAVENOUS EVERY 4 HOURS PRN
Status: ACTIVE | OUTPATIENT
Start: 2024-09-09 | End: 2024-09-09

## 2024-09-09 RX ORDER — ONDANSETRON 2 MG/ML
4 INJECTION INTRAMUSCULAR; INTRAVENOUS EVERY 6 HOURS PRN
Status: DISCONTINUED | OUTPATIENT
Start: 2024-09-09 | End: 2024-09-11

## 2024-09-09 RX ORDER — HYDROCODONE BITARTRATE AND ACETAMINOPHEN 5; 325 MG/1; MG/1
2 TABLET ORAL EVERY 4 HOURS PRN
Status: DISCONTINUED | OUTPATIENT
Start: 2024-09-09 | End: 2024-09-11

## 2024-09-09 RX ORDER — SODIUM CHLORIDE 9 MG/ML
INJECTION, SOLUTION INTRAVENOUS CONTINUOUS
Status: ACTIVE | OUTPATIENT
Start: 2024-09-09 | End: 2024-09-09

## 2024-09-09 NOTE — ED INITIAL ASSESSMENT (HPI)
Pt ambulatory to triage. Reports right flank pain that started last night - very sharp today. Was dx with kidney stones on the 31st at the PED. Now has had 3 episodes of vomiting. No fever.

## 2024-09-10 ENCOUNTER — APPOINTMENT (OUTPATIENT)
Dept: GENERAL RADIOLOGY | Facility: HOSPITAL | Age: 49
End: 2024-09-10
Attending: UROLOGY
Payer: COMMERCIAL

## 2024-09-10 ENCOUNTER — ANESTHESIA (OUTPATIENT)
Dept: SURGERY | Facility: HOSPITAL | Age: 49
End: 2024-09-10
Payer: COMMERCIAL

## 2024-09-10 ENCOUNTER — ANESTHESIA EVENT (OUTPATIENT)
Dept: SURGERY | Facility: HOSPITAL | Age: 49
End: 2024-09-10
Payer: COMMERCIAL

## 2024-09-10 LAB
ANION GAP SERPL CALC-SCNC: 5 MMOL/L (ref 0–18)
BASOPHILS # BLD AUTO: 0.03 X10(3) UL (ref 0–0.2)
BASOPHILS NFR BLD AUTO: 0.4 %
BUN BLD-MCNC: 14 MG/DL (ref 9–23)
CALCIUM BLD-MCNC: 9 MG/DL (ref 8.7–10.4)
CHLORIDE SERPL-SCNC: 109 MMOL/L (ref 98–112)
CO2 SERPL-SCNC: 28 MMOL/L (ref 21–32)
CREAT BLD-MCNC: 0.83 MG/DL
EGFRCR SERPLBLD CKD-EPI 2021: 86 ML/MIN/1.73M2 (ref 60–?)
EOSINOPHIL # BLD AUTO: 0.14 X10(3) UL (ref 0–0.7)
EOSINOPHIL NFR BLD AUTO: 1.7 %
ERYTHROCYTE [DISTWIDTH] IN BLOOD BY AUTOMATED COUNT: 13.7 %
GLUCOSE BLD-MCNC: 97 MG/DL (ref 70–99)
HCG UR QL: NEGATIVE
HCT VFR BLD AUTO: 35.4 %
HGB BLD-MCNC: 11.5 G/DL
IMM GRANULOCYTES # BLD AUTO: 0.02 X10(3) UL (ref 0–1)
IMM GRANULOCYTES NFR BLD: 0.2 %
LYMPHOCYTES # BLD AUTO: 2.7 X10(3) UL (ref 1–4)
LYMPHOCYTES NFR BLD AUTO: 33 %
MAGNESIUM SERPL-MCNC: 2.2 MG/DL (ref 1.6–2.6)
MCH RBC QN AUTO: 27.6 PG (ref 26–34)
MCHC RBC AUTO-ENTMCNC: 32.5 G/DL (ref 31–37)
MCV RBC AUTO: 85.1 FL
MONOCYTES # BLD AUTO: 0.83 X10(3) UL (ref 0.1–1)
MONOCYTES NFR BLD AUTO: 10.1 %
NEUTROPHILS # BLD AUTO: 4.47 X10 (3) UL (ref 1.5–7.7)
NEUTROPHILS # BLD AUTO: 4.47 X10(3) UL (ref 1.5–7.7)
NEUTROPHILS NFR BLD AUTO: 54.6 %
OSMOLALITY SERPL CALC.SUM OF ELEC: 294 MOSM/KG (ref 275–295)
PLATELET # BLD AUTO: 309 10(3)UL (ref 150–450)
POTASSIUM SERPL-SCNC: 3.6 MMOL/L (ref 3.5–5.1)
RBC # BLD AUTO: 4.16 X10(6)UL
SODIUM SERPL-SCNC: 142 MMOL/L (ref 136–145)
WBC # BLD AUTO: 8.2 X10(3) UL (ref 4–11)

## 2024-09-10 PROCEDURE — 0T768DZ DILATION OF RIGHT URETER WITH INTRALUMINAL DEVICE, VIA NATURAL OR ARTIFICIAL OPENING ENDOSCOPIC: ICD-10-PCS | Performed by: UROLOGY

## 2024-09-10 PROCEDURE — 85025 COMPLETE CBC W/AUTO DIFF WBC: CPT | Performed by: INTERNAL MEDICINE

## 2024-09-10 PROCEDURE — 80048 BASIC METABOLIC PNL TOTAL CA: CPT | Performed by: INTERNAL MEDICINE

## 2024-09-10 PROCEDURE — 0TC68ZZ EXTIRPATION OF MATTER FROM RIGHT URETER, VIA NATURAL OR ARTIFICIAL OPENING ENDOSCOPIC: ICD-10-PCS | Performed by: UROLOGY

## 2024-09-10 PROCEDURE — 81025 URINE PREGNANCY TEST: CPT | Performed by: UROLOGY

## 2024-09-10 PROCEDURE — 81025 URINE PREGNANCY TEST: CPT | Performed by: PHYSICIAN ASSISTANT

## 2024-09-10 PROCEDURE — 83735 ASSAY OF MAGNESIUM: CPT | Performed by: INTERNAL MEDICINE

## 2024-09-10 PROCEDURE — BT1D1ZZ FLUOROSCOPY OF RIGHT KIDNEY, URETER AND BLADDER USING LOW OSMOLAR CONTRAST: ICD-10-PCS | Performed by: UROLOGY

## 2024-09-10 DEVICE — URETERAL STENT
Type: IMPLANTABLE DEVICE | Site: URETER | Status: FUNCTIONAL
Brand: ASCERTA™

## 2024-09-10 RX ORDER — PHENAZOPYRIDINE HYDROCHLORIDE 100 MG/1
200 TABLET, FILM COATED ORAL 3 TIMES DAILY
Status: DISCONTINUED | OUTPATIENT
Start: 2024-09-10 | End: 2024-09-11

## 2024-09-10 RX ORDER — ONDANSETRON 2 MG/ML
4 INJECTION INTRAMUSCULAR; INTRAVENOUS EVERY 6 HOURS PRN
Status: DISCONTINUED | OUTPATIENT
Start: 2024-09-10 | End: 2024-09-10 | Stop reason: HOSPADM

## 2024-09-10 RX ORDER — HYDROMORPHONE HYDROCHLORIDE 1 MG/ML
0.6 INJECTION, SOLUTION INTRAMUSCULAR; INTRAVENOUS; SUBCUTANEOUS EVERY 5 MIN PRN
Status: DISCONTINUED | OUTPATIENT
Start: 2024-09-10 | End: 2024-09-10 | Stop reason: HOSPADM

## 2024-09-10 RX ORDER — HYDROCODONE BITARTRATE AND ACETAMINOPHEN 5; 325 MG/1; MG/1
2 TABLET ORAL ONCE AS NEEDED
Status: DISCONTINUED | OUTPATIENT
Start: 2024-09-10 | End: 2024-09-10 | Stop reason: HOSPADM

## 2024-09-10 RX ORDER — HYDROMORPHONE HYDROCHLORIDE 1 MG/ML
0.4 INJECTION, SOLUTION INTRAMUSCULAR; INTRAVENOUS; SUBCUTANEOUS EVERY 5 MIN PRN
Status: DISCONTINUED | OUTPATIENT
Start: 2024-09-10 | End: 2024-09-10 | Stop reason: HOSPADM

## 2024-09-10 RX ORDER — PROCHLORPERAZINE EDISYLATE 5 MG/ML
5 INJECTION INTRAMUSCULAR; INTRAVENOUS EVERY 8 HOURS PRN
Status: DISCONTINUED | OUTPATIENT
Start: 2024-09-10 | End: 2024-09-10 | Stop reason: HOSPADM

## 2024-09-10 RX ORDER — HEPARIN SODIUM 5000 [USP'U]/ML
5000 INJECTION, SOLUTION INTRAVENOUS; SUBCUTANEOUS EVERY 12 HOURS SCHEDULED
Status: DISCONTINUED | OUTPATIENT
Start: 2024-09-11 | End: 2024-09-11

## 2024-09-10 RX ORDER — DIPHENHYDRAMINE HYDROCHLORIDE 50 MG/ML
INJECTION INTRAMUSCULAR; INTRAVENOUS AS NEEDED
Status: DISCONTINUED | OUTPATIENT
Start: 2024-09-10 | End: 2024-09-10 | Stop reason: SURG

## 2024-09-10 RX ORDER — SODIUM CHLORIDE, SODIUM LACTATE, POTASSIUM CHLORIDE, CALCIUM CHLORIDE 600; 310; 30; 20 MG/100ML; MG/100ML; MG/100ML; MG/100ML
INJECTION, SOLUTION INTRAVENOUS CONTINUOUS
Status: DISCONTINUED | OUTPATIENT
Start: 2024-09-10 | End: 2024-09-11

## 2024-09-10 RX ORDER — LIDOCAINE HYDROCHLORIDE 20 MG/ML
JELLY TOPICAL AS NEEDED
Status: DISCONTINUED | OUTPATIENT
Start: 2024-09-10 | End: 2024-09-10 | Stop reason: HOSPADM

## 2024-09-10 RX ORDER — HYDROCODONE BITARTRATE AND ACETAMINOPHEN 5; 325 MG/1; MG/1
1 TABLET ORAL ONCE AS NEEDED
Status: DISCONTINUED | OUTPATIENT
Start: 2024-09-10 | End: 2024-09-10 | Stop reason: HOSPADM

## 2024-09-10 RX ORDER — ONDANSETRON 2 MG/ML
INJECTION INTRAMUSCULAR; INTRAVENOUS
Status: DISCONTINUED
Start: 2024-09-10 | End: 2024-09-10 | Stop reason: WASHOUT

## 2024-09-10 RX ORDER — NALOXONE HYDROCHLORIDE 0.4 MG/ML
0.08 INJECTION, SOLUTION INTRAMUSCULAR; INTRAVENOUS; SUBCUTANEOUS AS NEEDED
Status: DISCONTINUED | OUTPATIENT
Start: 2024-09-10 | End: 2024-09-10 | Stop reason: HOSPADM

## 2024-09-10 RX ORDER — CEFAZOLIN SODIUM 1 G/3ML
INJECTION, POWDER, FOR SOLUTION INTRAMUSCULAR; INTRAVENOUS AS NEEDED
Status: DISCONTINUED | OUTPATIENT
Start: 2024-09-10 | End: 2024-09-10 | Stop reason: SURG

## 2024-09-10 RX ORDER — MIDAZOLAM HYDROCHLORIDE 1 MG/ML
1 INJECTION INTRAMUSCULAR; INTRAVENOUS EVERY 5 MIN PRN
Status: DISCONTINUED | OUTPATIENT
Start: 2024-09-10 | End: 2024-09-10 | Stop reason: HOSPADM

## 2024-09-10 RX ORDER — MEPERIDINE HYDROCHLORIDE 25 MG/ML
12.5 INJECTION INTRAMUSCULAR; INTRAVENOUS; SUBCUTANEOUS AS NEEDED
Status: DISCONTINUED | OUTPATIENT
Start: 2024-09-10 | End: 2024-09-10 | Stop reason: HOSPADM

## 2024-09-10 RX ORDER — HYDROMORPHONE HYDROCHLORIDE 1 MG/ML
0.2 INJECTION, SOLUTION INTRAMUSCULAR; INTRAVENOUS; SUBCUTANEOUS EVERY 5 MIN PRN
Status: DISCONTINUED | OUTPATIENT
Start: 2024-09-10 | End: 2024-09-10 | Stop reason: HOSPADM

## 2024-09-10 RX ORDER — DIPHENHYDRAMINE HYDROCHLORIDE 50 MG/ML
12.5 INJECTION INTRAMUSCULAR; INTRAVENOUS AS NEEDED
Status: DISCONTINUED | OUTPATIENT
Start: 2024-09-10 | End: 2024-09-10 | Stop reason: HOSPADM

## 2024-09-10 RX ORDER — ACETAMINOPHEN 500 MG
1000 TABLET ORAL ONCE AS NEEDED
Status: DISCONTINUED | OUTPATIENT
Start: 2024-09-10 | End: 2024-09-10 | Stop reason: HOSPADM

## 2024-09-10 RX ORDER — DIAZEPAM 5 MG
5 TABLET ORAL EVERY 8 HOURS PRN
Status: DISCONTINUED | OUTPATIENT
Start: 2024-09-10 | End: 2024-09-11

## 2024-09-10 RX ORDER — SODIUM CHLORIDE, SODIUM LACTATE, POTASSIUM CHLORIDE, CALCIUM CHLORIDE 600; 310; 30; 20 MG/100ML; MG/100ML; MG/100ML; MG/100ML
INJECTION, SOLUTION INTRAVENOUS CONTINUOUS
Status: DISCONTINUED | OUTPATIENT
Start: 2024-09-10 | End: 2024-09-10 | Stop reason: HOSPADM

## 2024-09-10 RX ORDER — OXYBUTYNIN CHLORIDE 5 MG/1
5 TABLET ORAL EVERY 6 HOURS PRN
Status: DISCONTINUED | OUTPATIENT
Start: 2024-09-10 | End: 2024-09-11

## 2024-09-10 RX ORDER — DEXAMETHASONE SODIUM PHOSPHATE 4 MG/ML
VIAL (ML) INJECTION AS NEEDED
Status: DISCONTINUED | OUTPATIENT
Start: 2024-09-10 | End: 2024-09-10 | Stop reason: SURG

## 2024-09-10 RX ORDER — LIDOCAINE HYDROCHLORIDE 10 MG/ML
INJECTION, SOLUTION EPIDURAL; INFILTRATION; INTRACAUDAL; PERINEURAL AS NEEDED
Status: DISCONTINUED | OUTPATIENT
Start: 2024-09-10 | End: 2024-09-10 | Stop reason: SURG

## 2024-09-10 RX ADMIN — LIDOCAINE HYDROCHLORIDE 50 MG: 10 INJECTION, SOLUTION EPIDURAL; INFILTRATION; INTRACAUDAL; PERINEURAL at 17:40:00

## 2024-09-10 RX ADMIN — SODIUM CHLORIDE, SODIUM LACTATE, POTASSIUM CHLORIDE, CALCIUM CHLORIDE: 600; 310; 30; 20 INJECTION, SOLUTION INTRAVENOUS at 18:37:00

## 2024-09-10 RX ADMIN — ONDANSETRON 4 MG: 2 INJECTION INTRAMUSCULAR; INTRAVENOUS at 18:23:00

## 2024-09-10 RX ADMIN — DEXAMETHASONE SODIUM PHOSPHATE 4 MG: 4 MG/ML VIAL (ML) INJECTION at 17:40:00

## 2024-09-10 RX ADMIN — DIPHENHYDRAMINE HYDROCHLORIDE 12.5 MG: 50 INJECTION INTRAMUSCULAR; INTRAVENOUS at 17:40:00

## 2024-09-10 RX ADMIN — CEFAZOLIN SODIUM 2 G: 1 INJECTION, POWDER, FOR SOLUTION INTRAMUSCULAR; INTRAVENOUS at 17:45:00

## 2024-09-10 NOTE — ED PROVIDER NOTES
Patient Seen in: [facility] Emergency Department      History     Chief Complaint   Patient presents with    Stones     Stated Complaint: Was at PED on the  with stones, pain since 1930 last noc. Not getting better.    Subjective:   HPI    Patient is a 49-year-old female who states she was seen [facility] for right flank pain.  Patient was seen .  Patient states she had a right proximal large kidney stone.  Patient states she saw urology and planning for lithotripsy this Thursday.  Patient states she was doing well up until this morning when she has severe pain to the right flank 8-9 out of 10 sharp constant.  Patient tried a Norco this morning without relief.  Patient vomited several times.  Patient denies fevers, no chest pain, shortness of breath, remainder of review of systems negative.    Objective:   Past Medical History:    Asthma (HCC)    BRCA1 negative    BRCA2 negative    Esophageal reflux    Mild intermittent asthma (HCC)              Past Surgical History:   Procedure Laterality Date    Colonoscopy N/A 2021    Procedure: COLONOSCOPY;  Surgeon: Pierce Amin MD;  [place] [facility] [place] [facility], repeat in 10 years           x 2                 Social History     Socioeconomic History    Marital status:    Tobacco Use    Smoking status: Never    Smokeless tobacco: Never   Vaping Use    Vaping status: Never Used   Substance and Sexual Activity    Alcohol use: No    Drug use: No    Sexual activity: Yes     Partners: Male     Birth control/protection: Vasectomy   Social History Narrative     with children               Review of Systems    Positive for stated Chief Complaint: Stones    Other systems are as noted in HPI.  Constitutional and vital signs reviewed.      All other systems reviewed and negative except as noted above.    Physical Exam     ED Triage Vitals [24 1558]   BP (!) 162/91   Pulse 65   Resp 18   Temp 98.3 °F (36.8 °C)    Temp src Temporal   SpO2 99 %   O2 Device None (Room air)       Current Vitals:   Vital Signs  BP: (!) 167/77  Pulse: 64  Resp: 22  Temp: 98.1 °F (36.7 °C)  Temp src: Oral    Oxygen Therapy  SpO2: 98 %  O2 Device: None (Room air)            Physical Exam  GENERAL: Patient resting on the cart in no acute distress.  HEENT: Extraocular muscles intact, pupils equal round reactive to light, neck supple, no meningismus.  LUNGS: Lungs clear to auscultation bilaterally.  CARDIOVASCULAR: + S1-S2, regular rate and rhythm, no murmurs.  BACK: No CVA tenderness, no midline bony tenderness.  ABDOMEN: + Bowel sounds, soft, mild right flank tenderness , nondistended.  No rebound, no guarding, no hepatosplenomegaly.  EXTREMITIES: Full range of motion, no tenderness, good capillary refill.  SKIN: No rash, good turgor.  NEURO: Patient answers questions appropriately.  No focal deficits appreciated.           ED Course     Labs Reviewed   COMP METABOLIC PANEL (14) - Abnormal; Notable for the following components:       Result Value    Glucose 111 (*)     Creatinine 1.16 (*)     eGFR-Cr 58 (*)     All other components within normal limits   CBC WITH DIFFERENTIAL WITH PLATELET - Abnormal; Notable for the following components:    WBC 11.5 (*)     Neutrophil Absolute Prelim 9.57 (*)     Neutrophil Absolute 9.57 (*)     All other components within normal limits   URINALYSIS WITH CULTURE REFLEX - Abnormal; Notable for the following components:    Ketones Urine 10 (*)     Blood Urine 1+ (*)     RBC Urine 6-10 (*)     Squamous Epi. Cells Few (*)     All other components within normal limits   LIPASE - Normal             KUB Findings suggestive of a calculus in the proximal right ureter measuring up to 7 millimeters.   Independent reviewed by myself, no free air         MDM      Patient given Toradol and Zofran.  Patient did feel improved.  Patient was concerned about going home given the amount of pain she was having no relief with Norco.  I  did speak with urology.  Patient will be admitted for further evaluation.  Discussed options with the patient she would prefer to be admitted and have it taken care of.  I did consider kidney stone, UTI.  I did speak with the duly hospitalist.  Urology recommended n.p.o. after midnight.  Admission disposition: 9/9/2024  8:55 PM                                        Medical Decision Making      Disposition and Plan     Clinical Impression:  1. Kidney stone         Disposition:  Admit  9/9/2024  8:55 pm    Follow-up:  No follow-up provider specified.        Medications Prescribed:  Current Discharge Medication List                            Hospital Problems       Present on Admission  Date Reviewed: 10/18/2022            ICD-10-CM Noted POA    * (Principal) Kidney stone N20.0 9/9/2024 Unknown

## 2024-09-10 NOTE — H&P
General Medicine H&P     Chief Complaint   Patient presents with    Stones        PCP: Raeann Quiñones MD    History of Present Illness: Patient is a 49 year old female with PMH including but not limited to asthma, Breast ca, GERD, who p/t [facility] ED c R flank pain and known renal stone.     Pt had stone dx , planned ESWL on 24, Seen by Dr. Olivarez on 24 for right ureteral calculus and ED visit 24. Pain however worse last night, unable to bear. +vomiting. No fever/dysuria/hematuria. Pt had stone once before a/w pregnancy.     Pt currently feels ok, no pain/n/v.       Past Medical History:    Asthma (HCC)    BRCA1 negative    BRCA2 negative    Esophageal reflux    Mild intermittent asthma (HCC)      Past Surgical History:   Procedure Laterality Date    Colonoscopy N/A 2021    Procedure: COLONOSCOPY;  Surgeon: Pierce Amin MD;  [place] [facility] [place] [facility] repeat in 10 years           x 2         ALL:  No Known Allergies     ceFAZolin, 2 g, On Call to OR        Social History     Tobacco Use    Smoking status: Never    Smokeless tobacco: Never   Substance Use Topics    Alcohol use: No        Fam Hx  Family History   Problem Relation Age of Onset    Breast Cancer Mother 37        dx age 37    Cancer Father         melanoma     Cancer Maternal Grandfather         lung ca       Review of Systems  Comprehensive ROS reviewed and negative except for what's stated above. \    OBJECTIVE:  /65 (BP Location: Right arm)   Pulse 65   Temp 98.3 °F (36.8 °C) (Oral)   Resp 17   Ht 5' 8\" (1.727 m)   Wt 220 lb (99.8 kg)   LMP 2024 (Approximate)   SpO2 97%   BMI 33.45 kg/m²     General:  Alert, no distress, appears stated age.    Head:  Normocephalic, without obvious abnormality, atraumatic.   Eyes:  Sclera anicteric, EOMs intact.    Nose: Nares normal,  Mucosa normal    Throat: Lips normal   Neck: Supple, symmetrical, trachea midline   Lungs:   Clear to auscultation bilaterally.  Normal effort   Chest wall:  No tenderness or deformity   Heart:  Regular rate and rhythm, S1, S2 normal, no murmur, rub or gallop appreciated   Abdomen:   Soft, NT/ND, Bowel sounds normal. No masses,  No organomegaly.    Extremities/MSK: Extremities normal/normal movement, atraumatic, no cyanosis  or edema.   Skin: Skin color, texture, turgor normal. No rashes or lesions.    Neurologic: Moving all extremities spontaneously, no focal deficit appreciated          LABS:   Lab Results   Component Value Date    WBC 8.2 09/10/2024    HGB 11.5 09/10/2024    HCT 35.4 09/10/2024    .0 09/10/2024    CREATSERUM 0.83 09/10/2024    BUN 14 09/10/2024     09/10/2024    K 3.6 09/10/2024     09/10/2024    CO2 28.0 09/10/2024    GLU 97 09/10/2024    CA 9.0 09/10/2024    ALB 4.8 09/09/2024    ALKPHO 81 09/09/2024    BILT 0.4 09/09/2024    TP 7.8 09/09/2024    AST 25 09/09/2024    ALT 15 09/09/2024    LIP 27 09/09/2024    MG 2.2 09/10/2024       Radiology: XR ABDOMEN (1 VIEW) (CPT=74018)    Result Date: 9/9/2024  PROCEDURE:  XR ABDOMEN (1 VIEW) (CPT=74018)  INDICATIONS:  Was at PED on the 31 with stones, pain since 1930 last noc. Not getting better.  COMPARISON:  [facility] , CT, CT ABDOMEN+PELVIS KIDNEYSTONE 2D RNDR(NO IV,NO ORAL)(CPT=74176), 8/30/2024, 11:05 PM.  TECHNIQUE:  Supine AP view was obtained.  PATIENT STATED HISTORY: (As transcribed by Technologist)  Pt. with right lower quadrent pain  hx of kidney stone.    FINDINGS:  BOWEL GAS PATTERN:  Normal.  No abnormal dilation or deviation.  CALCIFICATIONS:  Suggestion of a calculus to the right of the L4 vertebral body likely represents the previously cm obstructing calculus measuring up to 7 millimeters.. OTHER:  Negative.  No abnormal gaseous collections.             CONCLUSION:  Findings suggestive of a calculus in the proximal right ureter measuring up to 7 millimeters.   LOCATION:  [facility]   Dictated by (CST): Musa Willett MD on 9/09/2024 at 7:43 PM      Finalized by (CST): Musa Willett MD on 9/09/2024 at 7:44 PM       CT ABDOMEN+PELVIS KIDNEYSTONE 2D RNDR(NO IV,NO ORAL)(CPT=74176)    Result Date: 8/30/2024  PROCEDURE:  CT ABDOMEN+PELVIS KIDNEYSTONE 2D RNDR(NO IV,NO ORAL)(CPT=74176)  COMPARISON:  [facility] , CT, CT ABD(S)/PLV(S)KID STONE(SET), 2/18/2005, 8:14 PM.  INDICATIONS:  Right flank pain  TECHNIQUE:  Unenhanced multislice CT scanning from above the kidneys to below the urinary bladder.  2D rendering are generated on the CT scanner workstation to localize potential stones in the cranio-caudal plane.  Dose reduction techniques were used. Dose information is transmitted to the ACR (American College of Radiology) NRDR (National Radiology Data Registry) which includes the Dose Index Registry.  PATIENT STATED HISTORY: (As transcribed by Technologist)  Right flank pain, history of kidney stones.    FINDINGS:  KIDNEYS:  7 x 5 mm proximal right ureteral calculus with mild right hydronephrosis. BLADDER:  No mass, calculus or significant wall thickening. ADRENALS:  No mass or enlargement.  LIVER:  No enlargement, atrophy, abnormal density, or significant focal lesion.  BILIARY:  No visible dilatation or calcification.  PANCREAS:  No lesion, fluid collection, ductal dilatation, or atrophy.  SPLEEN:  No enlargement or focal lesion.  AORTA/VASCULAR:  No aneurysm.  RETROPERITONEUM:  No mass or adenopathy.  BOWEL/MESENTERY:  Occasional uncomplicated colonic diverticula.  No bowel distention or bowel wall thickening. ABDOMINAL WALL:  Stable small fat containing umbilical hernia BONES:  No bony lesion or fracture. PELVIC ORGANS:  Normal for age.  Left ovarian cyst measuring up to 2.8 cm is consistent with benign functional cyst and requires no further workup or follow-up. LUNG BASES:  No visible pulmonary or pleural disease.              CONCLUSION:  1. 7 x 5 mm proximal right ureteral calculus with mild right hydronephrosis. 2. Rare uncomplicated colonic diverticula.     LOCATION:  [facility]   Dictated by (CST): Rene Monroe MD on 8/30/2024 at 11:27 PM     Finalized by (CST): Rene Monroe MD on 8/30/2024 at 11:33 PM            ASSESSMENT / PLAN:    49 year old female with PMH including but not limited to asthma, Breast ca, GERD, who p/t [facility] ED c R flank pain and known renal stone.     renal stone  R flank pain   - CT 8/30 c 7 x 5 mm proximal right ureteral calculus with mild right hydronephrosis  - XR c proximal right ureter measuring up to 7 millimeters.   - NPO  - IVF  -  c/s apprec, plan for OR; discussed cystoscopy, right retrograde pyelogram, insertion of a right ureteral stent followed by right ESWL versus cystoscopy, right retrograde pyelogram, right ureteroscopic stone extraction with laser lithotripsy, and insertion of a right ureteral stent.   - pain meds/abx as needed per    - CMP/CBC mostly ok    # MAGGY  - improved c IVF, 1.16 to 0.83    Leukocytosis  -reactive from above, 11.5 to 8.2       Asthma  - no exac  - home inhalers    # GERD  -PPI, wean as o/p if appropriate    # Breast ca    # Obesity  - d/t excess calories  - encourage diet/exercise/wt loss    # Proph  - scds, ambulate          Outpatient records or previous hospital records reviewed. [facility] hospitalist to continue to follow patient while in house. A total of 55 minutes taken.  Greater than 50% face to face encounter.  D/w pt/     Darrick Back MD  [facility] Hospitalist  Pager: [phone]  9/10/2024  1:49 PM

## 2024-09-10 NOTE — ANESTHESIA POSTPROCEDURE EVALUATION
[facility]    Kassidy Uribe Patient Status:  Observation   Age/Gender 49 year old female MRN [id number]   Location [facility] POST ANESTHESIA CARE UNIT Attending Venu Hill MD   Hosp Day # 0 PCP Raeann Quiñones MD       Anesthesia Post-op Note    CYSTOSCOPY, RIGHT LASER LITHOTRIPSY, URETEROSCOPY, RETROGRADE PYLEOGRAM, STENT INSERTION    Procedure Summary       Date: 09/10/24 Room / Location:  MAIN OR 07 / [facility] MAIN OR    Anesthesia Start: 1736 Anesthesia Stop: 1837    Procedure: CYSTOSCOPY, RIGHT LASER LITHOTRIPSY, URETEROSCOPY, RETROGRADE PYLEOGRAM, STENT INSERTION (Right: Ureter) Diagnosis:       Ureteral calculus, right      (Ureteral calculus, right [N20.1])    Surgeons: Matthew Quiñones MD Anesthesiologist: Pepe August MD    Anesthesia Type: general ASA Status: 2            Anesthesia Type: general    Vitals Value Taken Time   /79 09/10/24 1846   Temp 98.4 °F (36.9 °C) 09/10/24 1836   Pulse 72 09/10/24 1849   Resp 15 09/10/24 1849   SpO2 92 % 09/10/24 1849   Vitals shown include unfiled device data.    Patient Location: PACU    Anesthesia Type: general    Postop Pain Control: adequate    Mental Status: mildly sedated but able to meaningfully participate in the post-anesthesia evaluation    Nausea/Vomiting: none    Cardiopulmonary/Hydration status: stable euvolemic    Complications: no apparent anesthesia related complications    Postop vital signs: stable    Dental Exam: Unchanged from Preop

## 2024-09-10 NOTE — CONSULTS
[facility]  Department of Urology   Consultation Note    Kassidy Uribe Patient Status:  Observation    1975 MRN [id number]   Location [facility] 3SW-A Attending Venu Hill MD   Hosp Day # 0 PCP Raeann Quiñones MD     Reason for Consultation:  Acute right ureteral colic    History of Present Illness:  Kassidy Uribe is a a(n) 49 year old female.     Seen in ER on 24 for right flank pain. Diagnosed with right ureteral calculus.  Seen by Dr. Olivarez on 24 for right ureteral calculus.    Scheduled for ESWL on 24    Presented to ER with right flank pain.   +nausea/vomiting  No fever or chills  No dysuria or gross hematuria  No change in urinary pattern.     Labs:  WBC 11.5 > 8.2  Hgb 13.4 > 11.5  Platelet count 368 > 309  Serum creat 1.16  Serum calcium level 9.7  UA 24: 1-5 WBC/hpf, 6-10 RBC/hpf, no bacteria, few squamous epithelial cells    Abdominal/pelvic CT scan without contrast 24:  7 x 5 mm proximal right ureteral calculus with mild right hydronephrosis.     KUB 24:  Findings suggestive of a calculus in the proximal right ureter measuring up to 7 millimeters     Urology was consulted    Stone x 1 years ago - passed on own  Remote history of UTI  +FH of urolithiasis (brother)      History:  Past Medical History:    Asthma (HCC)    BRCA1 negative    BRCA2 negative    Esophageal reflux    Mild intermittent asthma (HCC)     Past Surgical History:   Procedure Laterality Date    Colonoscopy N/A 2021    Procedure: COLONOSCOPY;  Surgeon: Pierce Amin MD;  [place] [facility] [place], repeat in 10 years           x 2      Family History   Problem Relation Age of Onset    Breast Cancer Mother 37        dx age 37    Cancer Father         melanoma     Cancer Maternal Grandfather         lung ca      reports that she has never smoked. She has never used smokeless tobacco. She reports that she does not drink alcohol and does not use  drugs.    Allergies:  No Known Allergies    Medications:    Current Facility-Administered Medications:     lactated ringers infusion, , Intravenous, Continuous    acetaminophen (Tylenol) tab 650 mg, 650 mg, Oral, Q4H PRN **OR** HYDROcodone-acetaminophen (Norco) 5-325 MG per tab 1 tablet, 1 tablet, Oral, Q4H PRN **OR** HYDROcodone-acetaminophen (Norco) 5-325 MG per tab 2 tablet, 2 tablet, Oral, Q4H PRN    polyethylene glycol (PEG 3350) (Miralax) 17 g oral packet 17 g, 17 g, Oral, Daily PRN    sennosides (Senokot) tab 17.2 mg, 17.2 mg, Oral, Nightly PRN    bisacodyl (Dulcolax) 10 MG rectal suppository 10 mg, 10 mg, Rectal, Daily PRN    fleet enema (Fleet) rectal enema 133 mL, 1 enema, Rectal, Once PRN    ondansetron (Zofran) 4 MG/2ML injection 4 mg, 4 mg, Intravenous, Q6H PRN    prochlorperazine (Compazine) 10 MG/2ML injection 5 mg, 5 mg, Intravenous, Q8H PRN    ketorolac (Toradol) 15 MG/ML injection 15 mg, 15 mg, Intravenous, Q6H PRN    Review of Systems:  Pertinent items are noted in HPI.  10 point review of systems completed.  Physical Exam:  /63 (BP Location: Right arm)   Pulse 62   Temp 98.4 °F (36.9 °C) (Oral)   Resp 15   Ht 5' 8\" (1.727 m)   Wt 220 lb (99.8 kg)   LMP 09/01/2024 (Approximate)   SpO2 100%   BMI 33.45 kg/m²   GENERAL: the patient is resting in bed in no acute distress.    HEENT: Unremarkable.  NECK: Supple.   LUNGS: non-labored respirations.    ABDOMEN: The abdomen is soft and nontender without rebound or guarding.      BACK: Without CVA tenderness.   SKIN: Without stigmata.   NEUROLOGIC: Grossly intact.  EXTREMITIES: Without edema.      Laboratory Data:  Lab Results   Component Value Date    WBC 8.2 09/10/2024    HGB 11.5 09/10/2024    HCT 35.4 09/10/2024    .0 09/10/2024    CREATSERUM 1.16 09/09/2024    BUN 15 09/09/2024     09/09/2024    K 4.6 09/09/2024     09/09/2024    CO2 27.0 09/09/2024     09/09/2024    CA 9.7 09/09/2024    ALB 4.8 09/09/2024     ALKPHO 81 09/09/2024    BILT 0.4 09/09/2024    TP 7.8 09/09/2024    AST 25 09/09/2024    ALT 15 09/09/2024    LIP 27 09/09/2024     WBC 11.5 > 8.2  Hgb 13.4 > 11.5  Platelet count 368 > 309  Serum creat 1.16  Serum calcium level 9.7  UA 9/9/24: 1-5 WBC/hpf, 6-10 RBC/hpf, no bacteria, few squamous epithelial cells    Imaging:  Abdominal/pelvic CT scan without contrast 8/30/24:  FINDINGS:    KIDNEYS:  7 x 5 mm proximal right ureteral calculus with mild right hydronephrosis.  BLADDER:  No mass, calculus or significant wall thickening.  ADRENALS:  No mass or enlargement.    LIVER:  No enlargement, atrophy, abnormal density, or significant focal lesion.    BILIARY:  No visible dilatation or calcification.    PANCREAS:  No lesion, fluid collection, ductal dilatation, or atrophy.    SPLEEN:  No enlargement or focal lesion.    AORTA/VASCULAR:  No aneurysm.    RETROPERITONEUM:  No mass or adenopathy.    BOWEL/MESENTERY:  Occasional uncomplicated colonic diverticula.  No bowel distention or bowel wall thickening.  ABDOMINAL WALL:  Stable small fat containing umbilical hernia  BONES:  No bony lesion or fracture.  PELVIC ORGANS:  Normal for age.  Left ovarian cyst measuring up to 2.8 cm is consistent with benign functional cyst and requires no further workup or follow-up.  LUNG BASES:  No visible pulmonary or pleural disease.                        Impression   CONCLUSION:    1. 7 x 5 mm proximal right ureteral calculus with mild right hydronephrosis.  2. Rare uncomplicated colonic diverticula.       XR ABDOMEN (1 VIEW) (CPT=74018)    Result Date: 9/9/2024  CONCLUSION:  Findings suggestive of a calculus in the proximal right ureter measuring up to 7 millimeters.   LOCATION:  [facility]   Dictated by (CST): Musa Willett MD on 9/09/2024 at 7:43 PM     Finalized by (CST): Musa Willett MD on 9/09/2024 at 7:44 PM       Impression:  Patient Active Problem List   Diagnosis    Allergic rhinitis    Esophageal reflux    Mild intermittent  asthma without complication (HCC)    Hyperglycemia    Peroneal tendinitis of left lower extremity    Extensor tendinitis of foot    Family history of breast cancer    BRCA gene mutation negative in female    Monoallelic mutation of JAMSHID gene    Fibrocystic breast changes of both breasts    Neuroma of second interspace of left foot    Family history of melanoma    Elevated hemoglobin A1c    Iron deficiency anemia    Patellofemoral pain syndrome of right knee    Colon cancer screening    Chronic periscapular pain on right side    Obesity (BMI 30-39.9)    Primary osteoarthritis of right knee    Kidney stone     RIGHT FLANK PAIN, OBSTRUCTING RIGHT PROXIMAL URETERAL STONE (7 X 5 MM)  Afebrile, VSS  WBC 11.5 > 8.2  Hgb 13.4 > 11.5  Platelet count 368 > 309  Serum creat 1.16  Serum calcium level 9.7  UA 9/9/24: 1-5 WBC/hpf, 6-10 RBC/hpf, no bacteria, few squamous epithelial cells    Recommendations:  We discussed ureteroscopy as a minimally invasive surgical procedure whereby a small scope is placed through the urethra, through the bladder, up the ureter and potentially into the kidney to allow treatment of disease. A balloon or dilator is often used to stretch the ureter to ease scope passage or treat a scar or stricture. Contrast is often placed into the ureter and kidney to evaluate the anatomy with fluoroscopic x-ray. Lasers are often used to treat any obstruction such as stone or stricture. In most cases, the goal is complete removal of stone, occasionally this is not possible or indicated and in those cases there may be need for future procedures to remove remaining stones. Typically, the procedure causes some swelling in the ureter which can cause obstruction and pain, for that reason a ureteral stent is often left in place afterwards to alleviate those symptoms. Also, the stent allows easy access back into the kidney should there be residual stones to treat. The stent must be removed within 3 months or otherwise  risk that the stent may become encrusted making removal difficult and risk permanent renal damage. As with any procedure there are risks of bleeding, anesthesia, and infection. Also, the scope and laser are capable of perforating the ureter or kidney which could result in a scar or stricture requiring future procedures. These considerations were thoroughly discussed with the patient.  The patient is aware that any blood-thinning medications must be avoided for 7 days preoperatively and 5 days postoperatively.     Discussed cystoscopy, right retrograde pyelogram, insertion of a right ureteral stent followed by right ESWL versus cystoscopy, right retrograde pyelogram, right ureteroscopic stone extraction with laser lithotripsy, and insertion of a right ureteral stent.  Patient opted for ureteroscopy/laser lithotripsy.  Reviewed risks, benefits, alternatives, and complications of the procedure including but not limited to risk of anesthesia, bladder/ureteral injury, use of nephrostomy tube, bleeding, infection, inability to reach the stone necessitating a subsequent procedure, ureteral stent related symptoms with the patient who understands and wishes to proceed.  Patient informed the ureteral stent is not a permament implant and would eventually need to be removed.  Patient agrees and understands.    NPO  Consent to be signed  Ancef on call to OR    In the interim, continue with IV fluids, straining all urine, pain management.    Above discussed with patient, nurse  Will update Dr. Quiñones.    Thank you for allowing me to participate in the care of your patient.    Olivia Kirk PA-C  [facility]  9/10/2024  5:46 AM

## 2024-09-10 NOTE — BRIEF OP NOTE
Pre-Operative Diagnosis: Ureteral calculus, right [N20.1], right hydronephrosis, right flank pain     Post-Operative Diagnosis: Ureteral calculus, right [N20.1], right hydronephrosis, right flank pain     Procedure Performed:   CYSTOSCOPY, RIGHT RETROGRADE PYLEOGRAM, right ureteral dilatation, right ureteroscopy and pyeloscopy with LASER LITHOTRIPSY, right STENT INSERTION, fluoroscopy    Surgeons and Role:     * Matthew Quiñones MD - Primary    Assistant(s):        Surgical Findings: successful laser lithotripsy     Specimen: none     Estimated Blood Loss: None      Matthew Quiñones MD  9/10/2024  4:46 PM

## 2024-09-10 NOTE — PLAN OF CARE
ED admit 9/9 with kidney stone, Pt is AAOX4, VSS, room air, IV SL, up adlib, voiding freely to the restroom, straining all urine, plan for cysto w/ litho this afternoon, Pt is doing well, all needs met, all safety measures in place, call light within reach, will CTM.

## 2024-09-10 NOTE — ED QUICK NOTES
Orders for admission, patient is aware of plan and ready to go upstairs. Any questions, please call ED RN Ac at extension [phone].     Patient Covid vaccination status: Fully vaccinated     COVID Test Ordered in ED: None    COVID Suspicion at Admission: N/A    Running Infusions:  None    Mental Status/LOC at time of transport: Aox4    Other pertinent information:   CIWA score: N/A   NIH score:  N/A

## 2024-09-10 NOTE — ANESTHESIA PROCEDURE NOTES
Airway  Date/Time: 9/10/2024 5:41 PM  Urgency: elective      General Information and Staff    Patient location during procedure: OR  Anesthesiologist: Pepe August MD  Performed: anesthesiologist   Performed by: Pepe August MD  Authorized by: Pepe August MD      Indications and Patient Condition  Indications for airway management: anesthesia  Sedation level: deep  Preoxygenated: yes  Patient position: sniffing  Mask difficulty assessment: 0 - not attempted    Final Airway Details  Final airway type: supraglottic airway      Successful airway: classic  Size 3       Number of attempts at approach: 1

## 2024-09-11 VITALS
HEART RATE: 55 BPM | HEIGHT: 68 IN | BODY MASS INDEX: 33.34 KG/M2 | RESPIRATION RATE: 18 BRPM | OXYGEN SATURATION: 97 % | TEMPERATURE: 98 F | SYSTOLIC BLOOD PRESSURE: 137 MMHG | DIASTOLIC BLOOD PRESSURE: 69 MMHG | WEIGHT: 220 LBS

## 2024-09-11 LAB
ANION GAP SERPL CALC-SCNC: 4 MMOL/L (ref 0–18)
BASOPHILS # BLD AUTO: 0.02 X10(3) UL (ref 0–0.2)
BASOPHILS NFR BLD AUTO: 0.2 %
BUN BLD-MCNC: 12 MG/DL (ref 9–23)
CALCIUM BLD-MCNC: 9.6 MG/DL (ref 8.7–10.4)
CHLORIDE SERPL-SCNC: 107 MMOL/L (ref 98–112)
CO2 SERPL-SCNC: 28 MMOL/L (ref 21–32)
CREAT BLD-MCNC: 0.79 MG/DL
EGFRCR SERPLBLD CKD-EPI 2021: 92 ML/MIN/1.73M2 (ref 60–?)
EOSINOPHIL # BLD AUTO: 0.01 X10(3) UL (ref 0–0.7)
EOSINOPHIL NFR BLD AUTO: 0.1 %
ERYTHROCYTE [DISTWIDTH] IN BLOOD BY AUTOMATED COUNT: 13.4 %
GLUCOSE BLD-MCNC: 130 MG/DL (ref 70–99)
HCT VFR BLD AUTO: 36 %
HGB BLD-MCNC: 12.3 G/DL
IMM GRANULOCYTES # BLD AUTO: 0.02 X10(3) UL (ref 0–1)
IMM GRANULOCYTES NFR BLD: 0.2 %
LYMPHOCYTES # BLD AUTO: 1.04 X10(3) UL (ref 1–4)
LYMPHOCYTES NFR BLD AUTO: 11.7 %
MAGNESIUM SERPL-MCNC: 2.3 MG/DL (ref 1.6–2.6)
MCH RBC QN AUTO: 28.3 PG (ref 26–34)
MCHC RBC AUTO-ENTMCNC: 34.2 G/DL (ref 31–37)
MCV RBC AUTO: 82.8 FL
MONOCYTES # BLD AUTO: 0.64 X10(3) UL (ref 0.1–1)
MONOCYTES NFR BLD AUTO: 7.2 %
NEUTROPHILS # BLD AUTO: 7.17 X10 (3) UL (ref 1.5–7.7)
NEUTROPHILS # BLD AUTO: 7.17 X10(3) UL (ref 1.5–7.7)
NEUTROPHILS NFR BLD AUTO: 80.6 %
OSMOLALITY SERPL CALC.SUM OF ELEC: 290 MOSM/KG (ref 275–295)
PLATELET # BLD AUTO: 347 10(3)UL (ref 150–450)
POTASSIUM SERPL-SCNC: 4.2 MMOL/L (ref 3.5–5.1)
RBC # BLD AUTO: 4.35 X10(6)UL
SODIUM SERPL-SCNC: 139 MMOL/L (ref 136–145)
WBC # BLD AUTO: 8.9 X10(3) UL (ref 4–11)

## 2024-09-11 PROCEDURE — 85025 COMPLETE CBC W/AUTO DIFF WBC: CPT | Performed by: UROLOGY

## 2024-09-11 PROCEDURE — 83735 ASSAY OF MAGNESIUM: CPT | Performed by: UROLOGY

## 2024-09-11 PROCEDURE — 80048 BASIC METABOLIC PNL TOTAL CA: CPT | Performed by: UROLOGY

## 2024-09-11 RX ORDER — PHENAZOPYRIDINE HYDROCHLORIDE 200 MG/1
200 TABLET, FILM COATED ORAL 3 TIMES DAILY PRN
Qty: 20 TABLET | Refills: 0 | Status: SHIPPED | OUTPATIENT
Start: 2024-09-11

## 2024-09-11 NOTE — PLAN OF CARE
Patient alert and oriented x4. VSS, on RA. Mild discomfort reported when voiding otherwise no pain reported. Up independent in the room. Voiding in the bathroom. Dangler in place. Tolerating regular diet, denies N/V.       Plan: discharge home today

## 2024-09-11 NOTE — OPERATIVE REPORT
[facility]   part of [facility]    Operative Note         Kassidy Uribe Location: OR   [facility] [id number] MRN [id number]   Admission Date 9/9/2024 Operation Date 9/10/2024   Attending Physician Venu Hill MD       Patient Name: Kassidy Uribe     Preoperative Diagnosis: Ureteral calculus, right [N20.1], right hydronephrosis, right flank pain    Postoperative Diagnosis: Ureteral calculus, right [N20.1], right hydronephrosis, right flank pain    Procedure(s):  Cystoscopy, right retrograde pyelogram, right ureteral dilatation, right ureteroscopy with stone manipulation and pyeloscopy with laser lithotripsy of renal stone, right ureteral stent placement, fluoroscopy    Primary Surgeon: Matthew Quiñones MD           Anesthesia: General     History: Patient with first presentation of acute right ureteral colic with a 7 mm proximal ureteral stone with obstruction, and pain.    Operative Summary: The patient was prepped and draped in a sterile fashion after undergoing successful administration of general anesthesia while supine.  2% anesthetic lubricant was placed into the urethra.  The 21 Citizen of [place] cystoscope was advanced to the urethra into the bladder.  The bladder mucosa was inspected and was normal.  The right ureteral orifice was catheterized with a 5 Citizen of [place] open-ended catheter and a retrograde pyelogram was performed.  The obstructing stone was seen with significant hydronephrosis proximal to the stone.  An angled 0.38 Glidewire was introduced through the open-ended catheter and advanced up to the level of the stone.  Careful manipulation of the Glidewire allowed for advancement of the Glidewire proximal to the stone into the intrarenal collecting system seen under fluoroscopic guidance.  The open-ended catheter and cystoscope were then withdrawn.  The ureter was then dilated using an 8/10 ureteral dilator.  The ureteral dilator was then withdrawn.  The flexible digital ureteroscope was then advanced over the  Glidewire through the urethra into the bladder into the ureter successfully up to the level of the stone.  The Glidewire was then withdrawn and a 365 µm holmium laser was placed. Laser lithotripsy was then performed fragmenting the stone carefully with attention to the ureteral wall.  Once fragmented the stone could then be manipulated into the intrarenal collecting system successfully.  Nephroscopy was performed with manipulation of the sizable stone fragments into calyces of the upper pole collecting system for further laser lithotripsy. Laser lithotripsy was then performed of the stones until all fragments were less than 1/2 mm in size.  The laser was then withdrawn and a 0.38 Glidewire was introduced through the ureteroscope into the intrarenal collecting system.  The ureteroscope was then withdrawn and a 6 [place] 26 centimeter double-J stent was then advanced into position over the Glidewire with a good coil seen in the intrarenal collecting system as well as the bladder upon removal of the Glidewire through fluoroscopic and cystoscopic guidance.  The bladder was then evacuated of urine the string attached to the distal end of the stent was exited out through the urethra.  The patient was then transferred to the recovery room in good condition.    Estimated blood loss: None    Drains: 6 [place] 26 centimeter double-J stent    Condition: Good         Implants:   Implant Name Type Inv. Item Serial No.  Lot No. LRB No. Used Action   STENT URO [id number] PGTL SFT HYDRPHLC COAT - SN/A  STENT URO [id number] PGTL SFT HYDRPHLC COAT N/A [employer] WD [id number] Right 1 Implanted               Matthew Quiñones MD

## 2024-09-11 NOTE — PROGRESS NOTES
[facility] Hospitalist Progress Note     PCP: Raeann Quiñones MD    Chief Complaint: follow-up   Follow up for: The encounter diagnosis was Kidney stone.    Overnight/Interim Events:      SUBJECTIVE:  No blood in urine, little orange. LIttle uncomfortable/burning likely from stent.     OBJECTIVE:  Temp:  [98 °F (36.7 °C)-98.6 °F (37 °C)] 98.3 °F (36.8 °C)  Pulse:  [55-72] 55  Resp:  [15-22] 18  BP: (119-155)/(63-92) 137/69  SpO2:  [7 %-98 %] 97 %    Intake/Output:    Intake/Output Summary (Last 24 hours) at 9/11/2024 1219  Last data filed at 9/11/2024 0856  Gross per 24 hour   Intake 440 ml   Output 1200 ml   Net -760 ml       Last 3 Weights   09/09/24 2138 220 lb (99.8 kg)   09/09/24 1558 220 lb (99.8 kg)   08/30/24 2232 220 lb (99.8 kg)   10/23/22 0433 220 lb (99.8 kg)       Exam    General: Alert, no distress, appears stated age.     Head:  Normocephalic, without obvious abnormality, atraumatic.   Eyes:  Sclera anicteric, EOMs intact.    Nose: Nares normal,  Mucosa normal    Throat: Lips normal   Neck: Supple, symmetrical, trachea midline   Lungs:   Clear to auscultation bilaterally. Normal effort   Chest wall:  No tenderness or deformity   Heart:  Regular rate and rhythm, S1, S2 normal, no murmur, rub or gallop appreciated   Abdomen:   Soft, NT/ND, Bowel sounds normal. No masses,  No organomegaly.    Extremities: Extremities normal, atraumatic, no cyanosis or LE edema.   Skin: Skin color, texture, turgor normal. No rashes or lesions.    Neurologic: Moving all extremities spontaneously, no focal deficit appreciated      Data Review:       Labs:     Recent Labs   Lab 09/09/24  1824 09/10/24  0527 09/11/24  0519   WBC 11.5* 8.2 8.9   HGB 13.4 11.5* 12.3   MCV 85.2 85.1 82.8   .0 309.0 347.0       Recent Labs   Lab 09/09/24  1824 09/10/24  0527 09/11/24  0519    142 139   K 4.6 3.6 4.2    109 107   CO2 27.0 28.0 28.0   BUN 15 14 12   CREATSERUM 1.16* 0.83 0.79   CA 9.7 9.0 9.6   MG  --  2.2 2.3    * 97 130*       Recent Labs   Lab 09/09/24  1824   ALT 15   AST 25   ALB 4.8       No results for input(s): \"PGLU\" in the last 168 hours.    No results for input(s): \"TROP\" in the last 168 hours.      Meds:      phenazopyridine  200 mg Oral TID    heparin  5,000 Units Subcutaneous 2 times per day      lactated ringers Stopped (09/11/24 0700)    lactated ringers Stopped (09/10/24 0242)       oxybutynin    diazePAM    [Transfer Hold] acetaminophen **OR** [Transfer Hold] HYDROcodone-acetaminophen **OR** [Transfer Hold] HYDROcodone-acetaminophen    [Transfer Hold] polyethylene glycol (PEG 3350)    [Transfer Hold] sennosides    [Transfer Hold] bisacodyl    [Transfer Hold] fleet enema    [Transfer Hold] ondansetron    [Transfer Hold] prochlorperazine       Assessment/Plan:     49 year old female with PMH including but not limited to asthma, Breast ca, GERD, who p/t [facility] ED c R flank pain and known renal stone.      renal stone  R flank pain   R hydro  - CT 8/30 c 7 x 5 mm proximal right ureteral calculus with mild right hydronephrosis  - XR c proximal right ureter measuring up to 7 millimeters.   - NPO  - IVF  -  c/s apprec, s/p Cystoscopy, right retrograde pyelogram, right ureteral dilatation, right ureteroscopy with stone manipulation and pyeloscopy with laser lithotripsy of renal stone, right ureteral stent placement, fluoroscopy 9/10  - follow-up in 3-5 days for dangler stent removal with RN and with urology within the next 1 month to discuss stone prevention    - pain meds/abx as needed per    - CMP/CBC mostly ok     # MAGGY  - improved c IVF, 1.16 to 0.83 to 0.79     Leukocytosis  -reactive from above, 11.5 to 8.2  to 8.9        Asthma  - no exac  - home inhalers     # GERD  -PPI, wean as o/p if appropriate     # Breast ca     # Obesity  - d/t excess calories  - encourage diet/exercise/wt loss     # Proph  - scds, ambulate     Dispo: dc    Questions/concerns were discussed with patient. D/w RN  Total Time  spent coordinating care:  55 minutes    Darrick Back MD  [facility] Hospitalist  [phone]  9/11/2024  12:19 PM

## 2024-09-11 NOTE — PROGRESS NOTES
[facility]  Urology Progress Note    Kassidy Uribe Patient Status:  Observation    1975 MRN [id number]   Location [facility] 3SW-A Attending Venu Hill MD   Hosp Day # 0 PCP Raeann Quiñones MD     Subjective:  Kassidy Uribe is a(n) 49 year old female.    S/P cystoscopy, right RGPG, right ureteral dilation, right URS with stone manipulation and pyeloscopy with laser lithotripsy, right ureteral stent placement 9/10/24 with Dr. Quiñones    Current complaints: Pain controlled. No nausea, vomiting, fever, or chills. +dysuria, hematuria, irritative voiding symptoms.      Objective:  General appearance: alert, appears stated age, and cooperative  Blood pressure 119/63, pulse 59, temperature 98.6 °F (37 °C), temperature source Oral, resp. rate 18, height 5' 8\" (1.727 m), weight 220 lb (99.8 kg), last menstrual period 2024, SpO2 98%, not currently breastfeeding.  Lungs: non-labored respirations.  Abdomen: soft, non-tender  No flank tenderness.  Lab Results   Component Value Date    WBC 8.9 2024    HGB 12.3 2024    HCT 36.0 2024    .0 2024    CREATSERUM 0.79 2024    BUN 12 2024     2024    K 4.2 2024     2024    CO2 28.0 2024     2024    CA 9.6 2024    MG 2.3 2024       No results found for this visit on 24.     XR OR - N/C    Result Date: 9/10/2024  CONCLUSION:  1 fluoroscopic image(s) obtained and submitted during urologic procedure.  No radiologist was present for the exam.  Correlation with real-time fluoroscopy and operative report are recommended.   LOCATION:  [place]    Dictated by (CST): Musa Soni MD on 9/10/2024 at 6:56 PM     Finalized by (CST): Musa Soni MD on 9/10/2024 at 6:57 PM       XR ABDOMEN (1 VIEW) (CPT=74018)    Result Date: 2024  CONCLUSION:  Findings suggestive of a calculus in the proximal right ureter measuring up to 7 millimeters.   LOCATION:   [place]   Dictated by (CST): Musa Willett MD on 9/09/2024 at 7:43 PM     Finalized by (CST): Musa Willett MD on 9/09/2024 at 7:44 PM         Assessment:    RIGHT FLANK PAIN, OBSTRUCTING RIGHT PROXIMAL URETERAL STONE (7 X 5 MM)   S/P cystoscopy, right RGPG, right ureteral dilation, right URS with stone manipulation and pyeloscopy with laser lithotripsy, right ureteral stent placement 9/10/24 with Dr. Quiñones  Afebrile, VSS  WBC 11.5 > 8.2 > 8.9  Hgb 13.4 > 11.5 > 12.3  Platelet count 368 > 309 > 347  Serum creat 1.16 > 0.83 > 0.79  Serum calcium level 9.7 > 9 > 9.6  UA 9/9/24: 1-5 WBC/hpf, 6-10 RBC/hpf, no bacteria, few squamous epithelial cells    Plan:    Ureteral stent related symptoms reviewed with patient.  Pain management  Hydration  Patient to follow-up in 3-5 days for dangler stent removal with RN and with urology within the next 1 month to discuss stone prevention. TE sent to coordinate appt and cancel ESWL that was scheduled for 9/12/24.      Above discussed with patient, nurse, Dr. Quiñones.    DODIE Hanna [facility]  Department of Urology  9/11/2024  6:23 AM     Yes

## 2024-09-11 NOTE — PROGRESS NOTES
Patient cleared for discharge by Urology and Hospitalist. Pyridium requested and sent to patients pharmacy for discharge.   Instructions reviewed and patient stated understanding. IV removed. Await spouse to  for discharge.

## 2024-09-11 NOTE — DISCHARGE INSTRUCTIONS
Having a Ureteral Stent  A ureteral stent is a soft plastic tube with holes in it. It’s temporarily inserted into a ureter to help drain urine into the bladder. One end goes in the kidney. The other end goes in the bladder. A coil on each end holds the stent in place. The stent can’t be seen from outside the body. It shouldn’t interfere with your normal routine. Your stent will be put in by a doctor trained in treating the urinary tract (a urologist) or another specialist. The procedure is done in a hospital or surgery center. You’ll likely go home the same day.     THE URETERAL STENT IS NOT A PERMAMENT IMPLANT AND DOES NEED TO BE REMOVED IN 3-5 DAYS    When is a ureteral stent used?  A ureteral stent may be used:  To bypass a blockage in a kidney or ureter.  During kidney stone removal.  To let a ureter heal after surgery.    Before the procedure  Your healthcare provider will give you instructions to prepare for the procedure. X-rays or other imaging tests of your kidneys and ureters may be done beforehand.   During the procedure  You receive medicine to prevent pain and help you relax or sleep during the procedure. Once this takes effect, the procedure starts.  The doctor inserts a cystoscope (lighted instrument) through the urethra and into the bladder. This shows the opening to the ureter.  A thin wire is carefully threaded through the cystoscope, up the ureter, and into the kidney. The stent is inserted over the wire.  A fluoroscope (special X-ray machine) is used to help position the stent. When the stent is in place, the wire and cystoscope are removed.     While you have a stent  Some discomfort is normal. Certain movements may trigger pain or a feeling that you need to urinate. You may also feel mild soreness or pressure before or during urination. These symptoms will go away a few days after the stent is removed.  Medicine to control pain or bladder spasms or to prevent infection may be prescribed. Take  this as directed.  Drink plenty of fluids to help flush out your urinary tract.  Your urine may be slightly pink or red. This is due to bleeding caused by minor irritation from the stent. This may happen on and off while you have the stent.  As with any synthetic device placed in the body, there is a risk of infection. The stent may have to be removed if this happens.      How long will you need a stent?  The stent is often taken out after the blockage in the ureter is treated or the ureter has healed. This may take 1 week to 2 weeks, or longer. If a stent is needed for a long time, it may need to be changed every few months.   When to call your healthcare provider  Contact your healthcare provider right away if:  Your urine contains blood clots or you see a large amount of blood-tinged urine  You have symptoms similar to those you had before the stent was placed  You constantly leak urine  You have a fever over 100.4°F (38°C), or as advised by your health care provider  You have chills  You experience nausea or vomiting  Your pain is not relieved with medicine  The end of the stent comes out of the urethra  You experience new or worsening symptoms  Andrew last reviewed this educational content on 4/1/2020  © 3326-6644 [employer]. [address] [place] [address], [place] [address]. All rights reserved. This information is not intended as a substitute for professional medical care. Always follow your healthcare professional's instructions.

## 2024-09-12 NOTE — PAYOR COMM NOTE
Discharge Notification    Patient Name: TIGRE MCBRIDE  Payor: [employer]   Subscriber #: [id number]  Authorization Number: [id number]  Admit Date/Time: 9/9/2024 6:07 PM  Discharge Date/Time: 9/11/2024 11:05 AM    Observation

## 2024-12-17 NOTE — H&P
Patient ID: Sophia Alberto is a 75 y.o. female.    Chief Complaint: Sore Throat (No bottles//Pt c/o sore throat, head aches, burning sensation in the chest area x 3 days. Pt denies fever/chills and sinus congestion )    Pt here for sick visit- reports started 3 days ago with sore scratchy throat and then developed some myalgias. Feels burning sensation in upper chest when she takes a deep breath but denies any significant cough. No fever or chills. No wheezing or SOB            Past Medical History:   Diagnosis Date    Diverticulosis     History of nuclear stress test 2015    Normal    Hyperlipidemia     Hypertension     Mitral valve prolapse     Personal history of colonic polyps 11/01/2023     Past Surgical History:   Procedure Laterality Date    CHOLECYSTECTOMY      COLONOSCOPY  2014    Dr. Avilez RTC 10 years    COLONOSCOPY  11/01/2023    Repeat colonoscopy not recommended for surveillance per Dr Avilez    HYSTERECTOMY      LAPAROSCOPY         Tests to Keep You Healthy    Last Blood Pressure <= 139/89 (12/17/2024): Yes  Tobacco Cessation: NO        Tobacco History:  reports that she has been smoking vaping with nicotine. She has never used smokeless tobacco.      Review of patient's allergies indicates:   Allergen Reactions    Meperidine     Penicillins        Current Outpatient Medications:     amLODIPine (NORVASC) 5 MG tablet, Take 1 tablet (5 mg total) by mouth once daily., Disp: 90 tablet, Rfl: 3    aspirin (ECOTRIN) 81 MG EC tablet, Take 81 mg by mouth once daily., Disp: , Rfl:     azelastine (ASTELIN) 137 mcg (0.1 %) nasal spray, , Disp: , Rfl:     levothyroxine (SYNTHROID) 25 MCG tablet, Take 1 tablet (25 mcg total) by mouth before breakfast., Disp: 90 tablet, Rfl: 3    omeprazole (PRILOSEC) 40 MG capsule, Take 1 capsule (40 mg total) by mouth once daily., Disp: 90 capsule, Rfl: 3    raloxifene (EVISTA) 60 mg tablet, Take 1 tablet (60 mg total) by mouth once daily., Disp: 90 tablet, Rfl: 3     [facility] Hospitalist History and Physical      Patient presents with:  Dyspnea BERNARDO SOB (respiratory)  Chest Pain Angina (cardiovascular)       PCP: Renetta Fiore MD      History of Present Illness: Patient is a 43year old female with PMH sig for Asthma, G "rosuvastatin (CRESTOR) 5 MG tablet, Take 1 tablet (5 mg total) by mouth once daily., Disp: 90 tablet, Rfl: 3  No current facility-administered medications for this visit.    Review of Systems   Constitutional:  Negative for chills and fever.   HENT:  Positive for congestion and sore throat. Negative for ear pain, postnasal drip, rhinorrhea, sinus pain and trouble swallowing.    Respiratory:  Positive for cough. Negative for shortness of breath and wheezing.    Cardiovascular:  Negative for chest pain, palpitations and leg swelling.   Gastrointestinal:  Negative for abdominal pain, diarrhea, nausea and vomiting.   Genitourinary:  Negative for dysuria and hematuria.   Musculoskeletal:  Positive for myalgias.   Skin:  Negative for rash.   Neurological:  Positive for headaches. Negative for dizziness.          Objective:      Vitals:    12/17/24 1156   BP: 112/72   Pulse: 97   Temp: 98.5 °F (36.9 °C)   SpO2: 97%   Weight: 56.6 kg (124 lb 12.8 oz)   Height: 4' 11" (1.499 m)     Physical Exam  Vitals and nursing note reviewed.   Constitutional:       General: She is not in acute distress.     Appearance: Normal appearance. She is well-developed. She is not toxic-appearing.   HENT:      Head: Normocephalic and atraumatic.      Right Ear: Tympanic membrane and ear canal normal.      Left Ear: Tympanic membrane and ear canal normal.      Mouth/Throat:      Mouth: Mucous membranes are moist.      Pharynx: No oropharyngeal exudate or posterior oropharyngeal erythema.      Tonsils: No tonsillar exudate.   Cardiovascular:      Rate and Rhythm: Normal rate and regular rhythm.      Heart sounds: No murmur heard.  Pulmonary:      Effort: Pulmonary effort is normal. No respiratory distress.      Breath sounds: Normal breath sounds. No wheezing or rales.   Abdominal:      Palpations: Abdomen is soft.   Musculoskeletal:      Cervical back: Neck supple.   Skin:     General: Skin is warm and dry.      Findings: No rash.   Neurological: " Systems  Comprehensive ROS reviewed and negative except for what is stated in HPI.       OBJECTIVE:  /69 mmHg  Pulse 113  Temp(Src) 99 °F (37.2 °C) (Oral)  Resp 20  Ht 5' 8\" (1.727 m)  Wt 225 lb (102.059 kg)  BMI 34.22 kg/m2  SpO2 93%  LMP 05/18/2017      General: No focal deficit present.      Mental Status: She is alert and oriented to person, place, and time.           Assessment:       1. Viral URI           Plan:       Viral URI  -flu/COVID and strep negative. Recommend supportive care with fluids, rest, mucinex and cough med prn.  Advised to call for any worsening  -     POCT COVID-19 Rapid Screening  -     POCT Influenza A/B Molecular  -     POCT Strep A, Molecular  -     dexAMETHasone injection 4 mg      Follow up if symptoms worsen or fail to improve.        12/17/2024 Hellen Gibbons NP       lateral chest radiographs were obtained. PATIENT STATED HISTORY: (As transcribed by Technologist)  She has had wheezing and tightness in her chest since Monday. She has a history of asthma. She feels she has a cold which is making her asthma flare up.

## (undated) DEVICE — SOLUTION IRRIG 3000ML 0.9% NACL FLX CONT

## (undated) DEVICE — SLEEVE COMPR MD KNEE LEN SGL USE KENDALL SCD

## (undated) DEVICE — SYRINGE MED 10ML LL TIP W/O SFTY DISP

## (undated) DEVICE — SEAL BIOPSY PORT ACMI BX BLACK CAP

## (undated) DEVICE — 20 ML SYRINGE LUER-LOCK TIP: Brand: MONOJECT

## (undated) DEVICE — FIBER LSR 200UM 2J 80HZ 60W DL FOR LITHO

## (undated) DEVICE — 3M™ TEGADERM™ TRANSPARENT FILM DRESSING FRAME STYLE, 1626W, 4 IN X 4-3/4 IN (10 CM X 12 CM), 50/CT 4CT/CASE: Brand: 3M™ TEGADERM™

## (undated) DEVICE — GLOVE SUR 7 SENSICARE PI PIP CRM PWD F

## (undated) DEVICE — SINGLE-USE DIGITAL FLEXIBLE URETEROSCOPE: Brand: LITHOVUE

## (undated) DEVICE — CATHETER URET 5FR L70CM FLX OPN TIP NONPORTED

## (undated) DEVICE — DILATOR/SHEATH SET: Brand: 8/10 DILATOR/SHEATH SET

## (undated) DEVICE — PACK PBDS CYSTOSCOPY

## (undated) DEVICE — ADAPTER BX SEAL Y BIOPSY PORT ADJ FOR HYSTEROSCOPE

## (undated) DEVICE — GUIDEWIRE .038X150 STR ZIPWIRE

## (undated) DEVICE — SKN PREP SPNG STKS PVP PNT STR: Brand: MEDLINE INDUSTRIES, INC.

## (undated) NOTE — LETTER
[address], [facility]   Date:   10/18/2022     Name:   Mike Ruiz    YOB: 1975   MRN:   [id number]       WHERE IS YOUR PAIN NOW? Paulino the areas on your body where you feel the described sensations. Use the appropriate symbol. Juan Vargas the areas of radiation. Include all affected areas. Just to complete the picture, please draw in the face. ACHE:  ^ ^ ^   NUMBNESS:  0000   PINS & NEEDLES:  = = = =                              ^ ^ ^                       0000              = = = =                                    ^ ^ ^                       0000            = = = =      BURNING:  XXXX   STABBING: ////                  XXXX                ////                         XXXX          ////     Please paulino the line below indicating your degree of pain right now  with 0 being no pain 10 being the worst pain possible.                                          0             1             2              3             4              5              6              7             8             9             10         Patient Signature:

## (undated) NOTE — LETTER
[address]  [address]  Authorization for Surgical Operation and Procedure     Date:___________                                                                                                         Time:__________  I hereby authorize Surgeon(s):  Matthew Quiñones MD, my physician and his/her assistants (if applicable), which may include medical students, residents, and/or fellows, to perform the following surgical operation/ procedure and administer such anesthesia as may be determined necessary by my physician:  Operation/Procedure name (s) Procedure(s):  CYSTOSCOPY, right laser LITHOTRIPSY, ureteroscopy,retrograde pyleogram,stent insertion on Kassidy R [facility]   2.   I recognize that during the surgical operation/procedure, unforeseen conditions may necessitate additional or different procedures than those listed above.  I, therefore, further authorize and request that the above-named surgeon, assistants, or designees perform such procedures as are, in their judgment, necessary and desirable.    3.   My surgeon/physician has discussed prior to my surgery the potential benefits, risks and side effects of this procedure; the likelihood of achieving goals; and potential problems that might occur during recuperation.  They also discussed reasonable alternatives to the procedure, including risks, benefits, and side effects related to the alternatives and risks related to not receiving this procedure.  I have had all my questions answered and I acknowledge that no guarantee has been made as to the result that may be obtained.    4.   Should the need arise during my operation/procedure, which includes change of level of care prior to discharge, I also consent to the administration of blood and/or blood products.  Further, I understand that despite careful testing and screening of blood or blood products by collecting agencies, I may still be subject to ill effects as a result of  receiving a blood transfusion and/or blood products.  The following are some, but not all, of the potential risks that can occur: fever and allergic reactions, hemolytic reactions, transmission of diseases such as Hepatitis, AIDS and Cytomegalovirus (CMV) and fluid overload.  In the event that I wish to have an autologous transfusion of my own blood, or a directed donor transfusion, I will discuss this with my physician.  Check only if Refusing Blood or Blood Products  I understand refusal of blood or blood products as deemed necessary by my physician may have serious consequences to my condition to include possible death. I hereby assume responsibility for my refusal and release the hospital, its personnel, and my physicians from any responsibility for the consequences of my refusal.          o  Refuse      5.   I authorize the use of any specimen, organs, tissues, body parts or foreign objects that may be removed from my body during the operation/procedure for diagnosis, research or teaching purposes and their subsequent disposal by hospital authorities.  I also authorize the release of specimen test results and/or written reports to my treating physician on the hospital medical staff or other referring or consulting physicians involved in my care, at the discretion of the Pathologist or my treating physician.    6.   I consent to the photographing or videotaping of the operations or procedures to be performed, including appropriate portions of my body for medical, scientific, or educational purposes, provided my identity is not revealed by the pictures or by descriptive texts accompanying them.  If the procedure has been photographed/videotaped, the surgeon will obtain the original picture, image, videotape or CD.  The hospital will not be responsible for storage, release or maintenance of the picture, image, tape or CD.    7.   I consent to the presence of a  or observers in the operating room  as deemed necessary by my physician or their designees.    8.   I recognize that in the event my procedure results in extended X-Ray/fluoroscopy time, I may develop a skin reaction.    9. If I have a Do Not Attempt Resuscitation (DNAR) order in place, that status will be suspended while in the operating room, procedural suite, and during the recovery period unless otherwise explicitly stated by me (or a person authorized to consent on my behalf). The surgeon or my attending physician will determine when the applicable recovery period ends for purposes of reinstating the DNAR order.  10. Patients having a sterilization procedure: I understand that if the procedure is successful the results will be permanent and it will therefore be impossible for me to inseminate, conceive, or bear children.  I also understand that the procedure is intended to result in sterility, although the result has not been guaranteed.   11. I acknowledge that my physician has explained sedation/analgesia administration to me including the risk and benefits I consent to the administration of sedation/analgesia as may be necessary or desirable in the judgment of my physician.    I CERTIFY THAT I HAVE READ AND FULLY UNDERSTAND THE ABOVE CONSENT TO OPERATION and/or OTHER PROCEDURE.    _________________________________________  __________________________________  Signature of Patient     Signature of Responsible Person         ___________________________________         Printed Name of Responsible Person           _________________________________                 Relationship to Patient  _________________________________________  ______________________________  Signature of Witness          Date  Time      Patient Name: Kassidy Uribe     : 1975                 Printed: September 10, 2024     Medical Record #: [id number]                     Page 1 of 2                                    [address]   [address]    Consent for Anesthesia    IKassidy agree to be cared for by an anesthesiologist, who is specially trained to monitor me and give me medicine to put me to sleep or keep me comfortable during my procedure    I understand that my anesthesiologist is not an employee or agent of [facility] or [employer]. He or she works for [employer].    As the patient asking for anesthesia services, I agree to:  Allow the anesthesiologist (anesthesia doctor) to give me medicine and do additional procedures as necessary. Some examples are: Starting or using an “IV” to give me medicine, fluids or blood during my procedure, and having a breathing tube placed to help me breathe when I’m asleep (intubation). In the event that my heart stops working properly, I understand that my anesthesiologist will make every effort to sustain my life, unless otherwise directed by [facility] Do Not Resuscitate documents.  Tell my anesthesia doctor before my procedure:  If I am pregnant.  The last time that I ate or drank.  All of the medicines I take (including prescriptions, herbal supplements, and pills I can buy without a prescription (including street drugs/illegal medications). Failure to inform my anesthesiologist about these medicines may increase my risk of anesthetic complications.  If I am allergic to anything or have had a reaction to anesthesia before.  I understand how the anesthesia medicine will help me (benefits).  I understand that with any type of anesthesia medicine there are risks:  The most common risks are: nausea, vomiting, sore throat, muscle soreness, damage to my eyes, mouth, or teeth (from breathing tube placement).  Rare risks include: remembering what happened during my procedure, allergic reactions to medications, injury to my airway, heart, lungs, vision, nerves, or muscles and in extremely rare instances death.  My doctor has explained to me other choices available  to me for my care (alternatives).  Pregnant Patients (“epidural”):  I understand that the risks of having an epidural (medicine given into my back to help control pain during labor), include itching, low blood pressure, difficulty urinating, headache or slowing of the baby’s heart. Very rare risks include infection, bleeding, seizure, irregular heart rhythms and nerve injury.  Regional Anesthesia (“spinal”, “epidural”, & “nerve blocks”):  I understand that rare but potential complications include headache, bleeding, infection, seizure, irregular heart rhythms, and nerve injury.    I can change my mind about having anesthesia services at any time before I get the medicine.    _____________________________________________________________________________  Patient (or Representative) Signature/Relationship to Patient  Date   Time    _____________________________________________________________________________   Name (if used)    Language/Organization   Time    _____________________________________________________________________________  Anesthesiologist Signature     Date   Time  I have discussed the procedure and information above with the patient (or patient’s representative) and answered their questions. The patient or their representative has agreed to have anesthesia services.    _____________________________________________________________________________  Witness        Date   Time  I have verified that the signature is that of the patient or patient’s representative, and that it was signed before the procedure  Patient Name: Kassidy Uribe     : 1975                 Printed: September 10, 2024     Medical Record #: [id number]                     Page 2 of 2

## (undated) NOTE — ED AVS SNAPSHOT
[facility] in [address] [place] [address] Drive,[address]    [address] [place] [address]    Phone:  [phone]    Fax:  [address]   MRN: [id number]    Department:  [facility] [place]   Date of Visit:  5/15/2017 Discharge Instructions       Push clear fluids. Monitor for fever/chills/body aches or night sweats. Please return if worse. Continue breathing treatments regularly. Consistently take Allegra and Flonase. Please start oral steroid course.   Cheratussin primary care or a specialist physician for a follow-up visit, please tell this physician (or your personal doctor if your instructions are to return to your personal doctor) about any new or lasting problems.  The primary care or specialist physician will s - If you are a smoker or have smoked in the last 12 months, we encourage you to explore options for quitting.     - If you have concerns related to behavioral health issues or thoughts of harming yourself, contact [facility] a

## (undated) NOTE — IP AVS SNAPSHOT
[facility] [place] [address] [place], [address] ~ [phone]                Discharge Summary   5/18/2017    Amber Uribe           Admission Information        Provider Department    5/18/2017 Luis London MD  3nw-A - medication strength  - how much to take  - how to take this  - when to take this  - additional instructions        Take 40 mg for 3 days, then 20 mg for three days, then 10 mg for three days then stop    Daniel henderson Follow up with Angelica Jasmine MD.    Specialties:  Internal Medicine, PEDIATRICS    Contact information:    [address] [place] [address] Road  [phone]        Immunization History as of 5/19/2017  Reviewed on 5/18/2017    INFLUENZA 9/2 All belongings returned to patient at discharge Pt's bedside belongings    Medications Sent Home None to return    Medications Returned:           Additional Information       We are concerned for your overall well being:    - If you are a smoker or have s provide you with additional printed information. Not all patients will experience these side effects or respond to medications the same. Please call your provider or healthcare team if you have any questions regarding your medications while at home. blood pressure, fluid retention, mood changes, stomach upset/pain, headache, dizziness           Anti-Histamines     Fexofenadine HCl (ALLEGRA) 60 MG Oral Tab         Use:  Treat Allergies   Most common side effects:  Drowsiness, dry mouth   What to report

## (undated) NOTE — IP AVS SNAPSHOT
[facility] [place] [address] [place], [address] ~ [phone]                Discharge Summary   5/20/2017    Enio Dimas [facility]           Admission Information        Provider Department    5/20/2017 Sarah Major MD  5nw-A Commonly known as:  Chintan Heredia   What changed:    - when to take this  - reasons to take this        Take 10 mL by mouth 3 (three) times daily as needed for cough (DO NOT DRIVE WHEN TAKING THIS MEDICATION).     Michael elizondo Take 20 mg by mouth every morning before breakfast.                            sucralfate 1 GM/10ML Susp   Commonly known as:  CARAFATE        Take 10 mL (1 g total) by mouth 4 (four) times daily before meals and nightly.     Lorenza Payne Specialties:  Internal Medicine, PEDIATRICS    Contact information:    [address] [phone]          Follow up with your allergist.      Immunization History as of 5/23/2017  Never Reviewed    INFLUENZA 9/29/2016, 9/2 (05/18/17)  22 (05/18/17)  0.2 (05/18/17)  6.9 (05/18/17)  3.5 (05/21/17)  139 (05/21/17)  3.9 (05/21/17)  104      Pending Labs     Order Current Status    BLOOD CULTURE Preliminary result    BLOOD CULTURE Preliminary result      Radiology Exams     None prescribed to take and their potential SIDE EFFECTS. Your nurse will review your medications with you before you are discharged, and can provide you with additional printed information.  Not all patients will experience these side effects or respond to [facility] What to report to your healthcare provider: Head or mouth pain, coating on mouth/tongue, shortness of breath, sensation of heart racing, rash, or itching           Steroids     predniSONE 10 MG Oral Tab         Use:  To treat inflammation, to prevent or adeel

## (undated) NOTE — IP AVS SNAPSHOT
Patient Demographics     Address Phone E-mail Address    [address]  [place] [address] [phone] (Home)  [phone] (Mobile) [email or web address]      Emergency Contact(s)     Name Relation Home Work Mobile    Joao Uribe Spouse [phone] Amoxicillin-Pot Clavulanate 875-125 MG Tabs   Commonly known as:  AUGMENTIN        Take 1 tablet by mouth 2 (two) times daily.    Stop taking on:  5/28/2017    Thelda Book                              Beclomethasone Dipropionate 80 MCG/ACT Aers   Common Take 1 tablet (10 mg total) by mouth nightly.     Belinda Henriquez                           omeprazole 20 MG Cpdr   Commonly known as:  PRILOSEC        Take 20 mg by mouth every morning before breakfast.                            predniSONE 10 MG Tabs [id number] azithromycin (ZITHROMAX) 500 mg in sodium chloride 0.9 % 250 mL IVPB add-vantage 05/22/17 1630 New Bag      [id number] benzonatate (TESSALON) cap 200 mg 05/22/17 1532 Given      [id number] benzonatate (TESSALON) cap 200 mg 05/22/17 2001 Given Lab Results Last 24 Hours     No matching results found      Microbiology Results (All)     Procedure Component Value Units Date/Time    Blood Culture FREQ X 2 [[id number]] Collected:  05/20/17 1549    Order Status:  Completed Lab Status:  Preliminary resul Years of Education: N/A  Number of Children: N/A     Occupational History  None on file     Social History Main Topics   Smoking status: Never Smoker     Smokeless tobacco: Never Used    Alcohol Use: No    Drug Use: No    Sexual Activity: Not on file   No Pulm: course b/l  Abd; soft, not tender, +BS  Ext: no calf tenderness b/l  Skin dry  Psych appropriate mood/affect  Vascular + b/l radial pulses      Labs  WBC 20 Hg 12.9 Plt 395    Troponin <0.046  Na 136 K 4.7 Cl 105 Co2 23, BUN 19 Creatinine 0.95 gluocs Marital Status:   Spouse Name: N/A    Years of Education: N/A  Number of Children: N/A     Occupational History  None on file     Social History Main Topics   Smoking status: Never Smoker     Smokeless tobacco: Never Used    Alcohol Use: No    Drug CV: tachcyardic but regular nl S1/S2  Pulm: course b/l  Abd; soft, not tender, +BS  Ext: no calf tenderness b/l  Skin dry  Psych appropriate mood/affect  Vascular + b/l radial pulses      Labs  WBC 20 Hg 12.9 Plt 395    Troponin <0.046  Na 136 K 4.7 Cl 105 caused by a new cat. However, when she came back to the hospital there was concern over her worsening symptoms. CT scan was ordered which showed a focal infiltrate in her LLL.   It was not seen on prior x-rays due to it's location being obscured by the [facility] sucralfate 1 GM/10ML Oral Suspension Take 10 mL (1 g total) by mouth 4 (four) times daily before meals and nightly.  Disp: 420 mL Rfl: 0      No Known Allergies    Social History:    Social History   Marital Status:   Spouse Name: N/A    Years of Edu Fexofenadine HCl (ALLEGRA) 60 MG Oral Tab Take 1 tablet by mouth nightly.  Disp: 90 tablet Rfl: 3       Scheduled Medication:  • cefTRIAXone  2 g Intravenous Q24H   • azithromycin  500 mg Intravenous Q24H   • Heparin Sodium (Porcine)  5,000 Units Subcutaneo 05/18/17 : 225 lb (102.059 kg)  10/15/15 : 221 lb (100.245 kg)        Intake/Output Summary (Last 24 hours) at 05/21/17 1209  Last data filed at 05/21/17 0900   Gross per 24 hour   Intake    480 ml   Output      0 ml   Net    480 ml       /86 mmHg  P Invalid input(s): ARTERIALPH, ARTERIALPO2, ARTERIALPCO2, ARTERIALHCO3    No results for input(s): BNP in the last 72 hours.     Invalid input(s): TROPI      TSH   Date/Time Value Ref Range Status   11/30/2013 06:05 AM 2.690 0.450 - 4.500 uIU/mL Final   ---- Please note that only patients enrolled in the Medicare ACO, [facility] ACO and [address] [place] [address] will be handled by a member of the Care Management Team.  For all other patients, please follow usual protocol for discharge care transition.     Lace+ Score: 58  59-90 Hi Complications: please see EPIC    Disposition: Home or Self Care    Discharge Condition: alive    Discharge Medications: Current Discharge Medication List    START taking these medications    benzonatate 200 MG Oral Cap  Take 1 capsule (200 mg total) by mo Take 1 tablet by mouth nightly. Qty: 90 tablet Refills: 3  Associated Diagnoses: Allergic rhinitis, unspecified allergic rhinitis type    sucralfate 1 GM/10ML Oral Suspension  Take 10 mL (1 g total) by mouth 4 (four) times daily before meals and nightly. Name Date      INFLUENZA 09/29/16     INFLUENZA 09/28/15     INFLUENZA 10/30/14     INFLUENZA 11/15/13     INFLUENZA 10/30/12